# Patient Record
Sex: MALE | URBAN - METROPOLITAN AREA
[De-identification: names, ages, dates, MRNs, and addresses within clinical notes are randomized per-mention and may not be internally consistent; named-entity substitution may affect disease eponyms.]

---

## 2019-07-16 ENCOUNTER — EMPLOYEE WELLNESS (OUTPATIENT)
Dept: OTHER | Age: 49
End: 2019-07-16

## 2019-07-16 LAB
CHOLESTEROL/HDL RATIO: 7.2
CHOLESTEROL: 273 MG/DL
ESTIMATED AVERAGE GLUCOSE: 280 MG/DL
GLUCOSE BLD-MCNC: 357 MG/DL (ref 70–99)
HBA1C MFR BLD: 11.4 % (ref 4–6)
HDLC SERPL-MCNC: 38 MG/DL
LDL CHOLESTEROL: 170 MG/DL (ref 0–130)
PATIENT FASTING?: YES
TRIGL SERPL-MCNC: 324 MG/DL
VLDLC SERPL CALC-MCNC: ABNORMAL MG/DL (ref 1–30)

## 2019-07-22 VITALS — WEIGHT: 192 LBS

## 2023-06-12 ENCOUNTER — APPOINTMENT (OUTPATIENT)
Dept: CT IMAGING | Age: 53
DRG: 093 | End: 2023-06-12
Payer: COMMERCIAL

## 2023-06-12 ENCOUNTER — APPOINTMENT (OUTPATIENT)
Dept: MRI IMAGING | Age: 53
DRG: 093 | End: 2023-06-12
Payer: COMMERCIAL

## 2023-06-12 ENCOUNTER — HOSPITAL ENCOUNTER (EMERGENCY)
Age: 53
Discharge: HOME | DRG: 093 | End: 2023-06-12
Attending: EMERGENCY MEDICINE | Admitting: PSYCHIATRY & NEUROLOGY
Payer: COMMERCIAL

## 2023-06-12 VITALS
HEART RATE: 91 BPM | HEIGHT: 73 IN | WEIGHT: 205 LBS | DIASTOLIC BLOOD PRESSURE: 86 MMHG | OXYGEN SATURATION: 98 % | BODY MASS INDEX: 27.17 KG/M2 | TEMPERATURE: 98.7 F | RESPIRATION RATE: 16 BRPM | SYSTOLIC BLOOD PRESSURE: 152 MMHG

## 2023-06-12 DIAGNOSIS — R20.0 LEFT LEG NUMBNESS: ICD-10-CM

## 2023-06-12 DIAGNOSIS — R26.9 GAIT DIFFICULTY: ICD-10-CM

## 2023-06-12 DIAGNOSIS — R27.0 ATAXIA: Primary | ICD-10-CM

## 2023-06-12 DIAGNOSIS — R29.90 STROKE-LIKE SYMPTOMS: ICD-10-CM

## 2023-06-12 PROBLEM — I63.9 ISCHEMIC STROKE (HCC): Status: ACTIVE | Noted: 2023-06-12

## 2023-06-12 LAB
ANION GAP SERPL CALCULATED.3IONS-SCNC: 18 MMOL/L (ref 9–17)
ANION GAP: 14 MMOL/L (ref 7–16)
BASOPHILS # BLD: 0.09 K/UL (ref 0–0.2)
BASOPHILS NFR BLD: 1 % (ref 0–2)
BUN SERPL-MCNC: 19 MG/DL (ref 6–20)
CALCIUM SERPL-MCNC: 10 MG/DL (ref 8.6–10.4)
CHLORIDE SERPL-SCNC: 101 MMOL/L (ref 98–107)
CK SERPL-CCNC: 62 U/L (ref 39–308)
CO2 SERPL-SCNC: 18 MMOL/L (ref 20–31)
CREAT SERPL-MCNC: 1 MG/DL (ref 0.7–1.2)
EGFR, POC: >60 ML/MIN/1.73M2
EOSINOPHIL # BLD: 0.41 K/UL (ref 0–0.44)
EOSINOPHILS RELATIVE PERCENT: 6 % (ref 1–4)
ERYTHROCYTE [DISTWIDTH] IN BLOOD BY AUTOMATED COUNT: 11.8 % (ref 11.8–14.4)
EST. AVERAGE GLUCOSE BLD GHB EST-MCNC: 177 MG/DL
ETHANOL PERCENT: <0.01 %
ETHANOLAMINE SERPL-MCNC: <10 MG/DL
FOLATE SERPL-MCNC: 12.3 NG/ML
GFR SERPL CREATININE-BSD FRML MDRD: >60 ML/MIN/1.73M2
GLUCOSE BLD-MCNC: 202 MG/DL (ref 74–100)
GLUCOSE SERPL-MCNC: 205 MG/DL (ref 70–99)
HBA1C MFR BLD: 7.8 % (ref 4–6)
HCO3 VENOUS: 23.7 MMOL/L (ref 22–29)
HCT VFR BLD AUTO: 49.6 % (ref 40.7–50.3)
HCT VFR BLD AUTO: 54 % (ref 41–53)
HGB BLD-MCNC: 16.8 G/DL (ref 13–17)
IMM GRANULOCYTES # BLD AUTO: 0.05 K/UL (ref 0–0.3)
IMM GRANULOCYTES NFR BLD: 1 %
INR PPP: 0.9
LYMPHOCYTES # BLD: 26 % (ref 24–43)
LYMPHOCYTES NFR BLD: 1.89 K/UL (ref 1.1–3.7)
MCH RBC QN AUTO: 33.8 PG (ref 25.2–33.5)
MCHC RBC AUTO-ENTMCNC: 33.9 G/DL (ref 28.4–34.8)
MCV RBC AUTO: 99.8 FL (ref 82.6–102.9)
MONOCYTES NFR BLD: 0.73 K/UL (ref 0.1–1.2)
MONOCYTES NFR BLD: 10 % (ref 3–12)
MYOGLOBIN SERPL-MCNC: 42 NG/ML (ref 28–72)
NEGATIVE BASE EXCESS, VEN: 3 (ref 0–2)
NEUTROPHILS NFR BLD: 56 % (ref 36–65)
NEUTS SEG NFR BLD: 3.99 K/UL (ref 1.5–8.1)
NRBC AUTOMATED: 0 PER 100 WBC
O2 SAT, VEN: 40 % (ref 60–85)
PARTIAL THROMBOPLASTIN TIME: 28.8 SEC (ref 23–36.5)
PCO2, VEN: 45.2 MM HG (ref 41–51)
PH VENOUS: 7.33 (ref 7.32–7.43)
PLATELET # BLD AUTO: 252 K/UL (ref 138–453)
PMV BLD AUTO: 10.4 FL (ref 8.1–13.5)
PO2, VEN: 24.7 MM HG (ref 30–50)
POC BUN: 18 MG/DL (ref 8–26)
POC CHLORIDE: 104 MMOL/L (ref 98–107)
POC CREATININE: 0.71 MG/DL (ref 0.51–1.19)
POC HEMOGLOBIN: 18.4 G/DL (ref 13.5–17.5)
POC IONIZED CALCIUM: 1.29 MMOL/L (ref 1.15–1.33)
POC LACTIC ACID: 1.38 MMOL/L (ref 0.56–1.39)
POC SODIUM: 141 MMOL/L (ref 138–146)
POC TCO2: 24 MMOL/L (ref 22–30)
POTASSIUM BLD-SCNC: 4.1 MMOL/L (ref 3.5–4.5)
POTASSIUM SERPL-SCNC: 4.1 MMOL/L (ref 3.7–5.3)
PROTHROMBIN TIME: 11.6 SEC (ref 11.7–14.9)
RBC # BLD AUTO: 4.97 M/UL (ref 4.21–5.77)
SODIUM SERPL-SCNC: 137 MMOL/L (ref 135–144)
TROPONIN I SERPL HS-MCNC: 23 NG/L (ref 0–22)
TSH SERPL-MCNC: 1.08 UIU/ML (ref 0.3–5)
VIT B12 SERPL-MCNC: 353 PG/ML (ref 232–1245)
WBC OTHER # BLD: 7.2 K/UL (ref 3.5–11.3)

## 2023-06-12 PROCEDURE — 99285 EMERGENCY DEPT VISIT HI MDM: CPT

## 2023-06-12 PROCEDURE — 6370000000 HC RX 637 (ALT 250 FOR IP)

## 2023-06-12 PROCEDURE — 96360 HYDRATION IV INFUSION INIT: CPT

## 2023-06-12 PROCEDURE — 82746 ASSAY OF FOLIC ACID SERUM: CPT

## 2023-06-12 PROCEDURE — 70450 CT HEAD/BRAIN W/O DYE: CPT

## 2023-06-12 PROCEDURE — 82330 ASSAY OF CALCIUM: CPT

## 2023-06-12 PROCEDURE — G0480 DRUG TEST DEF 1-7 CLASSES: HCPCS

## 2023-06-12 PROCEDURE — 82607 VITAMIN B-12: CPT

## 2023-06-12 PROCEDURE — 82550 ASSAY OF CK (CPK): CPT

## 2023-06-12 PROCEDURE — 70551 MRI BRAIN STEM W/O DYE: CPT

## 2023-06-12 PROCEDURE — 83036 HEMOGLOBIN GLYCOSYLATED A1C: CPT

## 2023-06-12 PROCEDURE — 84443 ASSAY THYROID STIM HORMONE: CPT

## 2023-06-12 PROCEDURE — 82553 CREATINE MB FRACTION: CPT

## 2023-06-12 PROCEDURE — 84484 ASSAY OF TROPONIN QUANT: CPT

## 2023-06-12 PROCEDURE — 2580000003 HC RX 258

## 2023-06-12 PROCEDURE — 99239 HOSP IP/OBS DSCHRG MGMT >30: CPT | Performed by: PSYCHIATRY & NEUROLOGY

## 2023-06-12 PROCEDURE — 6360000004 HC RX CONTRAST MEDICATION: Performed by: EMERGENCY MEDICINE

## 2023-06-12 PROCEDURE — 96361 HYDRATE IV INFUSION ADD-ON: CPT

## 2023-06-12 PROCEDURE — 85014 HEMATOCRIT: CPT

## 2023-06-12 PROCEDURE — 93005 ELECTROCARDIOGRAM TRACING: CPT | Performed by: EMERGENCY MEDICINE

## 2023-06-12 PROCEDURE — 82565 ASSAY OF CREATININE: CPT

## 2023-06-12 PROCEDURE — 84520 ASSAY OF UREA NITROGEN: CPT

## 2023-06-12 PROCEDURE — 82947 ASSAY GLUCOSE BLOOD QUANT: CPT

## 2023-06-12 PROCEDURE — 85610 PROTHROMBIN TIME: CPT

## 2023-06-12 PROCEDURE — 1200000000 HC SEMI PRIVATE

## 2023-06-12 PROCEDURE — 85730 THROMBOPLASTIN TIME PARTIAL: CPT

## 2023-06-12 PROCEDURE — 99223 1ST HOSP IP/OBS HIGH 75: CPT | Performed by: PSYCHIATRY & NEUROLOGY

## 2023-06-12 PROCEDURE — 80051 ELECTROLYTE PANEL: CPT

## 2023-06-12 PROCEDURE — 83605 ASSAY OF LACTIC ACID: CPT

## 2023-06-12 PROCEDURE — 99222 1ST HOSP IP/OBS MODERATE 55: CPT | Performed by: PSYCHIATRY & NEUROLOGY

## 2023-06-12 PROCEDURE — 82803 BLOOD GASES ANY COMBINATION: CPT

## 2023-06-12 PROCEDURE — 80048 BASIC METABOLIC PNL TOTAL CA: CPT

## 2023-06-12 PROCEDURE — 85027 COMPLETE CBC AUTOMATED: CPT

## 2023-06-12 PROCEDURE — 83874 ASSAY OF MYOGLOBIN: CPT

## 2023-06-12 PROCEDURE — 70496 CT ANGIOGRAPHY HEAD: CPT

## 2023-06-12 RX ORDER — ASPIRIN 81 MG/1
81 TABLET, CHEWABLE ORAL DAILY
Status: DISCONTINUED | OUTPATIENT
Start: 2023-06-13 | End: 2023-06-12

## 2023-06-12 RX ORDER — ATORVASTATIN CALCIUM 80 MG/1
80 TABLET, FILM COATED ORAL NIGHTLY
Status: DISCONTINUED | OUTPATIENT
Start: 2023-06-12 | End: 2023-06-12 | Stop reason: HOSPADM

## 2023-06-12 RX ORDER — ONDANSETRON 2 MG/ML
4 INJECTION INTRAMUSCULAR; INTRAVENOUS EVERY 6 HOURS PRN
Status: DISCONTINUED | OUTPATIENT
Start: 2023-06-12 | End: 2023-06-12 | Stop reason: HOSPADM

## 2023-06-12 RX ORDER — 0.9 % SODIUM CHLORIDE 0.9 %
1000 INTRAVENOUS SOLUTION INTRAVENOUS ONCE
Status: COMPLETED | OUTPATIENT
Start: 2023-06-12 | End: 2023-06-12

## 2023-06-12 RX ORDER — ASPIRIN 81 MG/1
81 TABLET ORAL DAILY
Status: DISCONTINUED | OUTPATIENT
Start: 2023-06-12 | End: 2023-06-12 | Stop reason: HOSPADM

## 2023-06-12 RX ORDER — ATORVASTATIN CALCIUM 80 MG/1
80 TABLET, FILM COATED ORAL NIGHTLY
Status: DISCONTINUED | OUTPATIENT
Start: 2023-06-12 | End: 2023-06-12

## 2023-06-12 RX ORDER — CLOPIDOGREL BISULFATE 75 MG/1
75 TABLET ORAL DAILY
Status: DISCONTINUED | OUTPATIENT
Start: 2023-06-13 | End: 2023-06-12

## 2023-06-12 RX ORDER — ENOXAPARIN SODIUM 100 MG/ML
40 INJECTION SUBCUTANEOUS DAILY
Status: DISCONTINUED | OUTPATIENT
Start: 2023-06-12 | End: 2023-06-12 | Stop reason: HOSPADM

## 2023-06-12 RX ORDER — CLOPIDOGREL BISULFATE 75 MG/1
75 TABLET ORAL DAILY
Qty: 21 TABLET | Refills: 0 | Status: SHIPPED | OUTPATIENT
Start: 2023-06-12 | End: 2023-07-03

## 2023-06-12 RX ORDER — ASPIRIN 325 MG
325 TABLET, DELAYED RELEASE (ENTERIC COATED) ORAL ONCE
Status: COMPLETED | OUTPATIENT
Start: 2023-06-12 | End: 2023-06-12

## 2023-06-12 RX ORDER — ATORVASTATIN CALCIUM 80 MG/1
80 TABLET, FILM COATED ORAL NIGHTLY
Qty: 30 TABLET | Refills: 3 | Status: SHIPPED | OUTPATIENT
Start: 2023-06-12 | End: 2023-07-05

## 2023-06-12 RX ORDER — ONDANSETRON 4 MG/1
4 TABLET, ORALLY DISINTEGRATING ORAL EVERY 8 HOURS PRN
Status: DISCONTINUED | OUTPATIENT
Start: 2023-06-12 | End: 2023-06-12 | Stop reason: HOSPADM

## 2023-06-12 RX ORDER — ASPIRIN 300 MG/1
300 SUPPOSITORY RECTAL DAILY
Status: DISCONTINUED | OUTPATIENT
Start: 2023-06-12 | End: 2023-06-12 | Stop reason: HOSPADM

## 2023-06-12 RX ORDER — SODIUM CHLORIDE 9 MG/ML
INJECTION, SOLUTION INTRAVENOUS CONTINUOUS
Status: DISCONTINUED | OUTPATIENT
Start: 2023-06-12 | End: 2023-06-12 | Stop reason: HOSPADM

## 2023-06-12 RX ORDER — ASPIRIN 81 MG/1
81 TABLET ORAL DAILY
Qty: 30 TABLET | Refills: 3 | Status: SHIPPED | OUTPATIENT
Start: 2023-06-12 | End: 2023-07-05

## 2023-06-12 RX ORDER — CLOPIDOGREL 300 MG/1
300 TABLET, FILM COATED ORAL ONCE
Status: COMPLETED | OUTPATIENT
Start: 2023-06-12 | End: 2023-06-12

## 2023-06-12 RX ORDER — POLYETHYLENE GLYCOL 3350 17 G/17G
17 POWDER, FOR SOLUTION ORAL DAILY PRN
Status: DISCONTINUED | OUTPATIENT
Start: 2023-06-12 | End: 2023-06-12 | Stop reason: HOSPADM

## 2023-06-12 RX ADMIN — ASPIRIN 325 MG: 325 TABLET, COATED ORAL at 07:32

## 2023-06-12 RX ADMIN — SODIUM CHLORIDE 1000 ML: 9 INJECTION, SOLUTION INTRAVENOUS at 07:32

## 2023-06-12 RX ADMIN — IOPAMIDOL 90 ML: 755 INJECTION, SOLUTION INTRAVENOUS at 07:10

## 2023-06-12 RX ADMIN — CLOPIDOGREL BISULFATE 300 MG: 300 TABLET, FILM COATED ORAL at 07:32

## 2023-06-12 ASSESSMENT — ENCOUNTER SYMPTOMS
VOMITING: 0
ABDOMINAL PAIN: 0
COUGH: 0
SHORTNESS OF BREATH: 0
NAUSEA: 0

## 2023-06-12 NOTE — ED PROVIDER NOTES
[facility]  ED  Emergency Department Encounter  Emergency Medicine Resident     Pt Santa Gudino  MRN: [id number]  [place] 1970  Date of evaluation: 6/12/23  PCP:  No primary care provider on file. Note Started: 6:53 AM EDT      CHIEF COMPLAINT       Chief Complaint   Patient presents with    Extremity Weakness       HISTORY OF PRESENT ILLNESS  (Location/Symptom, Timing/Onset, Context/Setting, Quality, Duration, Modifying Factors, Severity.)      Art Muro is a 47 y.o. male who presents with left lower extremity weakness. Patient states the left lower extremity weakness and numbness started yesterday at around 2 PM, he states it is progressively worsened. And was notably worse this morning when he got up to go to the bathroom. Patient's wife who is at bedside states he has to stand with a wide base to not lose his balance. Patient denies any dizziness. Patient has a history of hypertension and diabetes. He is not on insulin for his diabetes. Not on any anticoagulation or antiplatelets. PAST MEDICAL / SURGICAL / SOCIAL / FAMILY HISTORY     Diabetes, HTN       has no past surgical history on file.       Social History     Socioeconomic History    Marital status: Not on file     Spouse name: Not on file    Number of children: Not on file    Years of education: Not on file    Highest education level: Not on file   Occupational History    Not on file   Tobacco Use    Smoking status: Not on file    Smokeless tobacco: Not on file   Substance and Sexual Activity    Alcohol use: Not on file    Drug use: Not on file    Sexual activity: Not on file   Other Topics Concern    Not on file   Social History Narrative    Not on file     Social Determinants of Health     Financial Resource Strain: Not on file   Food Insecurity: Not on file   Transportation Needs: Not on file   Physical Activity: Not on file   Stress: Not on file   Social Connections: Not on file   Intimate Partner Violence:

## 2023-06-12 NOTE — ED PROVIDER NOTES
FACULTY SIGN-OUT  ADDENDUM       Patient: Lottie Najera   MRN: [id number]  PCP:  No primary care provider on file. Note Started: 6/12/23, 7:27 AM EDT  Attestation  I was available and discussed any additional care issues that arose and coordinated the management plans with the resident(s) caring for the patient during my duty period. Any areas of disagreement with resident's documentation of care or procedures are noted on the chart. I was personally present for the key portions of any/all procedures during my duty period. I have documented in the chart those procedures where I was not present during the key portions. The patient's initial evaluation and plan have been discussed with the prior provider who initially evaluated the patient. Pertinent Comments: The patient is a 47 y.o. male taken in signout with isolated left lower extremity ataxia and intermittent inability to control the leg.     We are awaiting stroke consult noncritical and further imaging    ED COURSE      The patient was given the following medications:  Orders Placed This Encounter   Medications    iopamidol (ISOVUE-370) 76 % injection 90 mL    aspirin EC tablet 325 mg    clopidogrel (PLAVIX) tablet 300 mg    aspirin chewable tablet 81 mg    clopidogrel (PLAVIX) tablet 75 mg    atorvastatin (LIPITOR) tablet 80 mg    0.9 % sodium chloride bolus       RECENT VITALS:   BP: (!) 164/63  Pulse: (!) 103  Respirations: 17  Temp: 98.7 °F (37.1 °C) SpO2: 97 %    (Please note that portions of this note were completed with a voice recognition program.  Efforts were made to edit the dictations but occasionally words are mis-transcribed.)    MD Fidel Stewart  Attending Emergency Medicine Physician       Bebe Evangelista MD  06/12/23 1106

## 2023-06-12 NOTE — ED NOTES
Writer called lab due to ethanol level saying its been in process since 1330 and still hasnt resulted   states she will run it now  Will continue plan of care     Cari Kellogg RN  06/12/23 6640

## 2023-06-12 NOTE — PROGRESS NOTES
[address] [facility] [address]     Emergency/Trauma Note    PATIENT NAME: Jorge Wagoner    Shift date: 6/11/2023  Shift day: Sunday   Shift # 3    Room # 09/09   Name: Jorge Wagoner            Age: 47 y.o. Gender: male          Rastafari: None   Place of Confucianism: Unknown    Trauma/Incident type: Stroke Alert  Admit Date & Time: 6/12/2023  6:42 AM  TRAUMA NAME: N/A    ADVANCE DIRECTIVES IN CHART? No    NAME OF DECISION MAKER: Per next of kin hierarchy, patient's spouse, Lester Galan ([phone]), is patient's primary decision maker. RELATIONSHIP OF DECISION MAKER TO PATIENT: Patient's spouse    PATIENT/EVENT DESCRIPTION:  Jorge Wagoner is a 47 y.o. male who arrived via personal vehicle to ED9 and was paged out as a \"Stroke Alert. \" Per report, patient's \"leg was not functioning properly. \" Patient was in [address] [place] [address] when  visited. Pt to be admitted to 09/09. SPIRITUAL ASSESSMENT-INTERVENTION-OUTCOME:   responded to page and gathered patient information outside room.  introduced herself to patient's spouse who was present in room while patient was in CT Scan. Patient's spouse shared about patient's symptoms and indicated that he was feeling \"slightly anxious. \" Prince Patterson made follow-up visit and provided support to patient. Patient was coping well and indicated no needs. PATIENT BELONGINGS:   bagged and tagged patient's shoes and shirt. ANY BELONGINGS OF SIGNIFICANT VALUE NOTED:  See above. REGISTRATION STAFF NOTIFIED?   Yes      WHAT IS YOUR SPIRITUAL CARE PLAN FOR THIS PATIENT?:  [facility] can make follow-up visit, per request. Jil Jeffers can be reached 24/7 via [email or web address].     06/12/23 0702   Encounter Summary   Service Provided For: Patient and family together   Referral/Consult From: Multi-disciplinary team  (Stroke Alert)   Support System Spouse   Last Encounter  06/11/23   Complexity of Encounter Moderate   Begin Time 0702   End Time  0717   Total Time Calculated 15 min

## 2023-06-12 NOTE — ED NOTES
ED to inpatient nurses report      Chief Complaint:  Chief Complaint   Patient presents with    Extremity Weakness     Present to ED from: home wit complaints of left leg weakness since 2pm yesterday    MOA:     LOC: alert and orientated to name, place, date  Mobility: Independent  Oxygen Baseline: 97%    Current needs required:   Pending ED orders:   Present condition: stable    Why did the patient come to the ED? Pt came  c/o extremity weakness and numbness. Pt states symptoms start yesterday about 2pm. Pt said he felt like weight on his left leg, and when he walks he leans towards the left and felt dizzy as well. Early this morning he feel that his left leg is more weak when he walks towards the bathroom. What is the plan? MRI  Any procedures or intervention occur? CT    Mental Status:  Level of Consciousness: Alert (0)    Psych Assessment:   Psychosocial  Psychosocial (WDL): Within Defined Limits  Vital signs   Vitals:    06/12/23 0703 06/12/23 0717 06/12/23 0726 06/12/23 0730   BP: (!) 169/88 (!) 164/63  (!) 150/91   Pulse:  (!) 103  97   Resp:  17  17   Temp:       SpO2:       Weight:   205 lb (93 kg)    Height:   6' 1\" (1.854 m)         Vitals:  Patient Vitals for the past 24 hrs:   BP Temp Pulse Resp SpO2 Height Weight   06/12/23 0730 (!) 150/91 -- 97 17 -- -- --   06/12/23 0726 -- -- -- -- -- 6' 1\" (1.854 m) 205 lb (93 kg)   06/12/23 0717 (!) 164/63 -- (!) 103 17 -- -- --   06/12/23 0703 (!) 169/88 -- -- -- -- -- --   06/12/23 0649 -- 98.7 °F (37.1 °C) -- -- -- -- --   06/12/23 0647 (!) 176/101 -- (!) 105 16 97 % -- --      Visit Vitals  BP (!) 150/91   Pulse 97   Temp 98.7 °F (37.1 °C)   Resp 17   Ht 6' 1\" (1.854 m)   Wt 205 lb (93 kg)   SpO2 97%   BMI 27.05 kg/m²        LDAs:   Peripheral IV 06/12/23 Left Antecubital (Active)   Site Assessment Clean, dry & intact; Clean 06/12/23 0708   Line Status Blood return noted;Brisk blood return 06/12/23 0708   Phlebitis Assessment No symptoms 06/12/23 0708

## 2023-06-12 NOTE — ED NOTES
Pt came  c/o extremity weakness and numbness. Pt states symptoms start yesterday about 2pm. Pt said he felt like weight on his left leg, and when he walks he leans towards the left and felt dizzy as well. Early this morning he feel that his left leg is more weak when he walks towards the bathroom. Pt awake alert and oriented, not in distress, denies SOB or chest pain.       Derl File, RN  06/12/23 1196

## 2023-06-12 NOTE — H&P
Department of General Neurology  [facility]  Inpatient History and Physical Exam Note    History Obtained From:  patient, spouse, electronic medical record    CHIEF COMPLAINT:       Gait disturbance    HISTORY OF PRESENT ILLNESS:       The patient is a 47 y.o. male with PMHx of DM, celf palate, and HTN who presents with sudden onset imbalance, left leg numbness, and drifting towards left during ambulation. All started suddenly yesterday afternoon, resulted in fall without LOC or hit to the head last night, and then progressively worsened today to the point where patient was unable to ambulate without assistance. He denies any recent trauma or illnesses. No associated headache, dizziness, visual or speech disturbance, facial asymmetry, or confusion. No history of migraines, strokes, headaches. He does have intermittent chronic left hip pain which he feels is at baseline. Wife at bedside is RN and endorses pt is compliant with medications at home. Last know well: 14:00 on 6/11/2023      On presentation:  BP: 176/101  BSL: 202    Prior to arrival patient was on  Antiplatelets/anticoagulants: none  Statins: rosuvastatin calcium 10 mg qd    Smoking history: past social smoker (never regular tobacco use though) & quit 20 years ago    Home medications verified with pt's wife:  Glipizide 20 mg twice daily  Metformin 1000 mg twice daily  Jardiance 100 mg daily  Rosuvastatin calcium 10 mg daily  Nateglinide 120 mg 3 times daily  Lisinopril 10 mg daily  Esomeprazole magnesium 20 mg daily       PAST MEDICAL HISTORY :       Past Medical History:    No past medical history on file. Past Surgical History:    No past surgical history on file.     Social History:   Social History     Socioeconomic History    Marital status:      Spouse name: Not on file    Number of children: Not on file    Years of education: Not on file    Highest education level: Not on file   Occupational History    Not on file

## 2023-06-12 NOTE — ED NOTES
Writer spoke with family member after she was asking for an update  Writer updated family member on how we are still waiting for labs to come back  Writer has called down to lab due to them letting an ethanol level be in process for 3 hours and how it is unacceptable  Writer apologizes to family member and lets them know that it is out of my control  Neuro resident made it clear that these labs have to come back before safetly being discharged  Family member told writer how pt is a daily drinker and is now getting irritated due to situation  Told family member that Page Mares will update her as soon as we get a result  Will continue plan of care     Thompson Villaseñor RN  06/12/23 6226

## 2023-06-12 NOTE — ED PROVIDER NOTES
[facility]   [facility]  Faculty Attestation       I performed a history and physical examination of the patient and discussed management with the resident. I reviewed the residents note and agree with the documented findings including all diagnostic interpretations and plan of care. Any areas of disagreement are noted on the chart. I was personally present for the key portions of any procedures. I have documented in the chart those procedures where I was not present during the key portions. I have reviewed the emergency nurses triage note. I agree with the chief complaint, past medical history, past surgical history, allergies, medications, social and family history as documented unless otherwise noted below. For Physician Assistant/ Nurse Practitioner cases/documentation I have personally evaluated this patient and have completed at least one if not all key elements of the E/M (history, physical exam, and MDM). Additional findings are as noted. Patient Name: Emma Domingo  MRN: [id number]  : 1970  Primary Care Physician: No primary care provider on file. Date of evaluationa: 2023   Note Started: 9:05 AM EDT    Pertinent Comments     Chief Complaint:   Chief Complaint   Patient presents with    Extremity Weakness        Initial vitals: (If not listed, please see nursing documentation)  ED Triage Vitals   BP Temp Temp src Pulse Respirations SpO2 Height Weight - Scale   23 0647 23 0649 -- 23 0647 23 0647 23 0647 23 0726 23 0726   (!) 176/101 98.7 °F (37.1 °C)  (!) 105 16 97 % 6' 1\" (1.854 m) 205 lb (93 kg)        HPI/PE/Impression: This is a 47 y.o. male who presents to the Emergency Department w/ left lower extremity abnormality. States that he is not able to \"control\" his left leg well. First noticed this at 2 PM yesterday. He denies any other symptoms including no facial droop. No change in speech.   No upper extremity

## 2023-06-12 NOTE — ED NOTES
The following labs were labeled with appropriate pt sticker and tubed to lab:     [x] Blue     [x] Lavender   [] on ice  [x] Green/yellow  [x] Green/black [] on ice  [] Vianca Fuentes  [] on ice  [x] Yellow  [x] Red  [] Type/ Screen  [] ABG  [] VBG    [] COVID-19 swab    [] Rapid  [] PCR  [] Flu swab  [] Peds Viral Panel     [] Urine Sample  [] Fecal Sample  [] Pelvic Cultures  [] Blood Cultures  [] X 2  [] STREP Cultures       Elin Bradley, GALINA  06/12/23 3827

## 2023-06-12 NOTE — DISCHARGE INSTRUCTIONS
You were seen in the hospital with sudden onset imbalance and difficulty with walking. We did an MRI of your brain and You did not have a stroke. However, because you do have stroke risk factors, we have started you on some medications to help prevent strokes. You are still having extreme difficulty with walking and despite our recommendation to stay in the hospital to do physical therapy and get you into rehab, your preference was to go home, so per your insistence, we will discharge you home with outpatient therapies. Please schedule appointments for physical therapy, for an outpatient EMG (which tests the nerves in your legs), for follow up in Neurology clinic, and for follow up with your PCP. Take all new medications as prescribed; take Aspirin 81 mg daily every day life-long, take Plavix 75 mg daily only for the next 21 days. Stop taking rosuvastatin 10 mg daily and start taking atorvastatin 80 mg nightly instead. Other than the rosuvastatin, Your other home medications can be taken as currently prescribed. Monitor your blood pressure very day and make a log with your BP to review with your PCP. Get your labwork done tomorrow to check for a fasting lipid panel (we would have checked in hospital, but you wanted to go home and this needs to be done fasting). Gradually/slowly reduce alcohol consumption until you are no longer consuming alcohol; this will help with your balance and leg numbness. If symptoms worsen or recur, please go to your nearest emergency department.

## 2023-06-12 NOTE — ED PROVIDER NOTES
[facility] ED  Emergency Department  Emergency Medicine Resident Turn-Over     Note Started: 7:26 AM EDT    Care of Haleigh Miranda was assumed from Dr. Catalina Torres and is being seen for Extremity Weakness  . The patient's initial evaluation and plan have been discussed with the prior provider who initially evaluated the patient. EMERGENCY DEPARTMENT COURSE / MEDICAL DECISION MAKING:       MEDICATIONS GIVEN:  Orders Placed This Encounter   Medications    iopamidol (ISOVUE-370) 76 % injection 90 mL    aspirin EC tablet 325 mg    clopidogrel (PLAVIX) tablet 300 mg    aspirin chewable tablet 81 mg    clopidogrel (PLAVIX) tablet 75 mg    atorvastatin (LIPITOR) tablet 80 mg    0.9 % sodium chloride bolus       LABS / RADIOLOGY:     Labs Reviewed   STROKE PANEL - Abnormal; Notable for the following components:       Result Value    MCH 33.8 (*)     Eosinophils % 6 (*)     Immature Granulocytes 1 (*)     All other components within normal limits   HGB/HCT - Abnormal; Notable for the following components:    POC Hemoglobin 18.4 (*)     POC Hematocrit 54 (*)     All other components within normal limits   VENOUS BLOOD GAS, POINT OF CARE - Abnormal; Notable for the following components:    pO2, Brady 24.7 (*)     Negative Base Excess, Brady 3 (*)     O2 Sat, Brady 40 (*)     All other components within normal limits   POCT GLUCOSE - Abnormal; Notable for the following components:    POC Glucose 202 (*)     All other components within normal limits   ELECTROLYTES PLUS   CALCIUM, IONIC (POC)   CREATININE W/GFR POINT OF CARE   UREA N (POC)   LACTIC ACID,POINT OF CARE       No results found. RECENT VITALS:     Temp: 98.7 °F (37.1 °C),  Pulse: 97, Respirations: 17, BP: (!) 150/91, SpO2: 97 %    This patient is a 47 y.o. Male with weakness, ataxia in LLE starting 2 pm yesterday, worsening. Unaware of LLE in space. Hx DM, HTN, not on anticoagulation. No prior hx CVA.  Stroke alert noncritical for NIH 1. CT head, CTA

## 2023-06-12 NOTE — CONSULTS
Department of Endovascular Neurosurgery                                                                                                                      Resident Consult Note  Stroke Alert paged @ 6:52 AM  ER Room # 9  Arrival to patient bedside @ 7:00 AM        Reason for Consult:  stroke alert  Requesting Physician:  Dr. Tenzin Sandra  Stroke Attending:   []Dr. Alex Agent  []Dr. Marylen Dew  [x]Dr. Belle Montanez    History Obtained From:  patient, spouse, electronic medical record    CHIEF COMPLAINT:       Gait disturbance    HISTORY OF PRESENT ILLNESS:       The patient is a 47 y.o. male with PMHx of DM, celf palate, and HTN who presents with sudden onset imbalance, left leg numbness, and drifting towards left during ambulation. All started suddenly yesterday afternoon, resulted in fall without LOC or hit to the head last night, and then progressively worsened today to the point where patient was unable to ambulate without assistance. He denies any recent trauma or illnesses. No associated headache, dizziness, visual or speech disturbance, facial asymmetry, or confusion. No history of migraines, strokes, headaches. He does have intermittent chronic left hip pain which he feels is at baseline. Wife at bedside is RN and endorses pt is compliant with medications at home. Last know well: 14:00 on 6/11/2023      On presentation:  BP: 176/101  BSL: 202    Prior to arrival patient was on  Antiplatelets/anticoagulants: none  Statins: rosuvastatin calcium 10 mg qd    Smoking history: past social smoker (never regular tobacco use though) & quit 20 years ago       PAST MEDICAL HISTORY :       Past Medical History:    No past medical history on file. Past Surgical History:    No past surgical history on file.     Social History:   Social History     Socioeconomic History    Marital status:      Spouse name: Not on file    Number of

## 2023-06-12 NOTE — DISCHARGE SUMMARY
Neurology Discharge Summary     Patient Identification:  Juan José Moreno is a 47 y.o. male. :  1970  Admit Date:  2023  Discharge date and time: No discharge date for patient encounter. Attending Provider: Dony Remy DO     Account Number: [id number]                                   Admission Diagnoses:   Ischemic stroke [facility]) [I63.9]    Discharge Diagnoses:  Principal Problem:    Stroke-like symptoms  Active Problems:    Ataxia    Left leg numbness    Gait difficulty  Resolved Problems:    * No resolved hospital problems. *      Discharge condition:    Consults:   {consultation:32262}    Hospital Course:***      Significant Diagnostics:   {diagnostics:10216}  CBC:   Recent Labs     23  07   WBC 7.2   HGB 16.8        BMP:    Recent Labs     23  0655 23   NA  --  137   K  --  4.1   CL  --  101   CO2  --  18*   BUN  --  19   CREATININE 0.71 1.00   GLUCOSE  --  205*         Lab Results   Component Value Date    CHOL 273 (H) 2019    LDLCHOLESTEROL 170 (H) 2019    HDL 38 (L) 2019    TRIG 324 (H) 2019    TSH 1.08 2023    INR 0.9 2023    LABA1C 11.4 (H) 2019    [id number] 353 2023       No results found for: PHENYTOIN, PHENYTOIN, VALPROATE, CBMZ      Discharge Medications:    Current Discharge Medication List             Details   aspirin 81 MG EC tablet Take 1 tablet by mouth daily  Qty: 30 tablet, Refills: 3      atorvastatin (LIPITOR) 80 MG tablet Take 1 tablet by mouth nightly  Qty: 30 tablet, Refills: 3      clopidogrel (PLAVIX) 75 MG tablet Take 1 tablet by mouth daily for 21 days  Qty: 21 tablet, Refills: 0           Current Discharge Medication List          Disposition:   {disposition:51718}    Patient Instructions: You were seen in the hospital with sudden onset imbalance and difficulty with walking. We did an MRI of your brain and You did not have a stroke.   However, because you do have stroke risk factors, we

## 2023-06-13 LAB
EKG ATRIAL RATE: 103 BPM
EKG P AXIS: 53 DEGREES
EKG P-R INTERVAL: 142 MS
EKG Q-T INTERVAL: 332 MS
EKG QRS DURATION: 80 MS
EKG QTC CALCULATION (BAZETT): 434 MS
EKG R AXIS: 5 DEGREES
EKG T AXIS: 48 DEGREES
EKG VENTRICULAR RATE: 103 BPM

## 2023-06-30 NOTE — TELEPHONE ENCOUNTER
E-scribe requesting refill for Plavix. Please review and e-scribe if applicable.      Last Visit Date: 6/12/2023    Next Visit Date:  Future Appointments   Date Time Provider [address]   8/15/2023  2:00 PM Brad Cabello MD Neuro [facility] Neurology -

## 2023-07-04 RX ORDER — CLOPIDOGREL BISULFATE 75 MG/1
75 TABLET ORAL DAILY
Qty: 21 TABLET | Refills: 0 | OUTPATIENT
Start: 2023-07-04 | End: 2023-07-25

## 2023-07-05 RX ORDER — ATORVASTATIN CALCIUM 80 MG/1
TABLET, FILM COATED ORAL
Qty: 30 TABLET | Refills: 3 | Status: SHIPPED | OUTPATIENT
Start: 2023-07-05

## 2023-07-05 RX ORDER — ASPIRIN 81 MG/1
TABLET, COATED ORAL
Qty: 30 TABLET | Refills: 3 | Status: SHIPPED | OUTPATIENT
Start: 2023-07-05

## 2023-07-05 NOTE — TELEPHONE ENCOUNTER
E-scribe requesting refill for ASPIRIN LOW DOSE 81 MG . Please review and e-scribe if applicable.      Last Visit Date: 06/26/23    Next Visit Date:  Future Appointments   Date Time Provider [address] [place] [address]   8/15/2023  2:00 PM Dorothy Alexander MD Neuro [facility] Neurology -

## 2023-07-05 NOTE — TELEPHONE ENCOUNTER
E-scribe requesting refill for atorvastatin (LIPITOR) 80 MG . Please review and e-scribe if applicable.      Last Visit Date: 06/12/23    Next Visit Date:  Future Appointments   Date Time Provider [facility] [address]   8/15/2023  2:00 PM Khushi Hernández MD Neuro [facility] Neurology -

## 2023-07-27 ENCOUNTER — OFFICE VISIT (OUTPATIENT)
Dept: NEUROLOGY | Age: 53
End: 2023-07-27
Payer: COMMERCIAL

## 2023-07-27 VITALS
DIASTOLIC BLOOD PRESSURE: 77 MMHG | BODY MASS INDEX: 26.77 KG/M2 | HEIGHT: 73 IN | SYSTOLIC BLOOD PRESSURE: 117 MMHG | HEART RATE: 98 BPM | WEIGHT: 202 LBS

## 2023-07-27 DIAGNOSIS — R20.0 LEFT LEG NUMBNESS: ICD-10-CM

## 2023-07-27 DIAGNOSIS — I63.9 CEREBROVASCULAR ACCIDENT (CVA), UNSPECIFIED MECHANISM (HCC): ICD-10-CM

## 2023-07-27 DIAGNOSIS — R27.0 ATAXIA: Primary | ICD-10-CM

## 2023-07-27 DIAGNOSIS — E13.42 DIABETIC POLYNEUROPATHY ASSOCIATED WITH OTHER SPECIFIED DIABETES MELLITUS (HCC): ICD-10-CM

## 2023-07-27 DIAGNOSIS — G47.30 SLEEP APNEA IN ADULT: ICD-10-CM

## 2023-07-27 PROCEDURE — 3051F HG A1C>EQUAL 7.0%<8.0%: CPT | Performed by: PSYCHIATRY & NEUROLOGY

## 2023-07-27 PROCEDURE — 99205 OFFICE O/P NEW HI 60 MIN: CPT | Performed by: PSYCHIATRY & NEUROLOGY

## 2023-07-27 RX ORDER — LISINOPRIL 10 MG/1
10 TABLET ORAL DAILY
COMMUNITY
Start: 2023-04-27

## 2023-07-27 RX ORDER — GABAPENTIN 100 MG/1
100 CAPSULE ORAL 2 TIMES DAILY
Qty: 180 CAPSULE | Refills: 1 | Status: SHIPPED | OUTPATIENT
Start: 2023-07-27 | End: 2024-01-23

## 2023-07-27 RX ORDER — LORATADINE 10 MG/1
TABLET ORAL
COMMUNITY
Start: 2023-06-11

## 2023-07-27 RX ORDER — EMPAGLIFLOZIN 25 MG/1
25 TABLET, FILM COATED ORAL DAILY
COMMUNITY
Start: 2023-06-13

## 2023-07-27 RX ORDER — NATEGLINIDE 120 MG/1
TABLET ORAL
COMMUNITY
Start: 2023-06-13

## 2023-07-27 RX ORDER — GLIPIZIDE 10 MG/1
TABLET ORAL
COMMUNITY
Start: 2023-05-10

## 2023-07-27 RX ORDER — ESOMEPRAZOLE MAGNESIUM 20 MG/1
20 FOR SUSPENSION ORAL DAILY
COMMUNITY

## 2023-07-27 NOTE — PROGRESS NOTES
exercise    I would like to thank you for the consult. Please do not hesitate if you have any questions about the patient care. Return in about 3 months (around 10/27/2023). The patient  acknowledged and understood the instructions, advised to reach the office for any concerns. greater than 50% of time spent face to face, reviewing images, labs, and other notes, obtaining history, examining patient, counseling and coordinating care. Sherie Ocampo MD  [facility]  Neurology     This note is created with the assistance of a speech-recognition program. While intending to generate a document that actually reflects the content of the visit, the document can still have some errors including those of syntax and sound a- like substitutions which may escape proofreading. In such instances, actual meaning can be extrapolated by contextual derivation.

## 2023-08-04 ENCOUNTER — TELEPHONE (OUTPATIENT)
Dept: NEUROLOGY | Age: 53
End: 2023-08-04

## 2023-08-04 NOTE — TELEPHONE ENCOUNTER
Hyacinth from [facility] called asking to update the order placed for MRI Brain with contrast to:  MRI Brain with and without contrast due to insurance reasons. Please update order. Thank you.

## 2023-10-03 RX ORDER — ASPIRIN 81 MG/1
TABLET, COATED ORAL
Qty: 90 TABLET | Refills: 1 | OUTPATIENT
Start: 2023-10-03

## 2023-10-03 RX ORDER — ATORVASTATIN CALCIUM 80 MG/1
TABLET, FILM COATED ORAL
Qty: 90 TABLET | Refills: 1 | OUTPATIENT
Start: 2023-10-03

## 2023-11-08 ENCOUNTER — HOSPITAL ENCOUNTER (OUTPATIENT)
Dept: NEUROLOGY | Age: 53
Discharge: HOME | End: 2023-11-08
Payer: COMMERCIAL

## 2023-11-08 PROCEDURE — 95886 MUSC TEST DONE W/N TEST COMP: CPT | Performed by: PHYSICAL MEDICINE & REHABILITATION

## 2023-11-08 PROCEDURE — 95909 NRV CNDJ TST 5-6 STUDIES: CPT | Performed by: PHYSICAL MEDICINE & REHABILITATION

## 2023-11-09 RX ORDER — GABAPENTIN 100 MG/1
100 CAPSULE ORAL 2 TIMES DAILY
Qty: 180 CAPSULE | Refills: 1 | OUTPATIENT
Start: 2023-11-09 | End: 2024-05-07

## 2023-12-07 ENCOUNTER — OFFICE VISIT (OUTPATIENT)
Dept: NEUROLOGY | Age: 53
End: 2023-12-07
Payer: COMMERCIAL

## 2023-12-07 VITALS
HEART RATE: 99 BPM | WEIGHT: 212 LBS | SYSTOLIC BLOOD PRESSURE: 171 MMHG | HEIGHT: 73 IN | BODY MASS INDEX: 28.1 KG/M2 | DIASTOLIC BLOOD PRESSURE: 98 MMHG

## 2023-12-07 DIAGNOSIS — E13.42 DIABETIC POLYNEUROPATHY ASSOCIATED WITH OTHER SPECIFIED DIABETES MELLITUS (HCC): Primary | ICD-10-CM

## 2023-12-07 DIAGNOSIS — I63.9 CEREBROVASCULAR ACCIDENT (CVA), UNSPECIFIED MECHANISM (HCC): ICD-10-CM

## 2023-12-07 DIAGNOSIS — R20.0 LEFT LEG NUMBNESS: ICD-10-CM

## 2023-12-07 DIAGNOSIS — R27.0 ATAXIA: ICD-10-CM

## 2023-12-07 DIAGNOSIS — G47.30 SLEEP APNEA IN ADULT: ICD-10-CM

## 2023-12-07 PROCEDURE — 99214 OFFICE O/P EST MOD 30 MIN: CPT | Performed by: PSYCHIATRY & NEUROLOGY

## 2023-12-07 PROCEDURE — 3051F HG A1C>EQUAL 7.0%<8.0%: CPT | Performed by: PSYCHIATRY & NEUROLOGY

## 2023-12-07 RX ORDER — GABAPENTIN 100 MG/1
100 CAPSULE ORAL 3 TIMES DAILY
Qty: 180 CAPSULE | Refills: 1 | Status: SHIPPED | OUTPATIENT
Start: 2023-12-07 | End: 2024-06-04

## 2023-12-07 NOTE — PROGRESS NOTES
CONTRAST  Narrative: EXAMINATION:  CTA OF THE HEAD AND NECK WITH CONTRAST 6/12/2023 6:56 am:    TECHNIQUE:  CTA of the head and neck was performed with the administration of intravenous  contrast. Multiplanar reformatted images are provided for review. MIP images  are provided for review. Stenosis of the internal carotid arteries measured  using NASCET criteria. Automated exposure control, iterative reconstruction,  and/or weight based adjustment of the mA/kV was utilized to reduce the  radiation dose to as low as reasonably achievable. COMPARISON:  None. HISTORY:  ORDERING SYSTEM PROVIDED HISTORY: LLE weakness    FINDINGS:    CTA NECK:    AORTIC ARCH/ARCH VESSELS: No dissection or arterial injury. No significant  stenosis of the brachiocephalic or subclavian arteries. CAROTID ARTERIES: No dissection, arterial injury, or hemodynamically  significant stenosis by NASCET criteria. VERTEBRAL ARTERIES: No dissection, arterial injury, or significant stenosis  within the right cervical vertebral artery. Origin of the left V1 segment  not well visualized with fairly diffuse left cervical vertebral artery  narrowing with areas of diminished opacification concerning for long segment  dissection. SOFT TISSUES: The lung apices are clear. No cervical or superior mediastinal  lymphadenopathy. The larynx and pharynx are unremarkable. No acute  abnormality of the salivary and thyroid glands. BONES: No acute osseous abnormality. CTA HEAD:    ANTERIOR CIRCULATION: No significant stenosis of the intracranial internal  carotid, anterior cerebral, or middle cerebral arteries. No aneurysm. POSTERIOR CIRCULATION: Occlusion of the left V4 segment. No significant  stenosis of the right vertebral, basilar, or posterior cerebral arteries. No  aneurysm. OTHER: No dural venous sinus thrombosis on this non-dedicated study. BRAIN: No mass effect or midline shift. No extra-axial fluid collection.

## 2024-01-09 DIAGNOSIS — I63.9 CEREBROVASCULAR ACCIDENT (CVA), UNSPECIFIED MECHANISM (HCC): Primary | ICD-10-CM

## 2024-01-09 RX ORDER — ATORVASTATIN CALCIUM 80 MG/1
TABLET, FILM COATED ORAL
Qty: 90 TABLET | Refills: 1 | OUTPATIENT
Start: 2024-01-09

## 2024-01-09 RX ORDER — ATORVASTATIN CALCIUM 80 MG/1
80 TABLET, FILM COATED ORAL NIGHTLY
Qty: 30 TABLET | Refills: 2 | Status: SHIPPED | OUTPATIENT
Start: 2024-01-09 | End: 2024-04-08

## 2024-01-09 NOTE — TELEPHONE ENCOUNTER
A refill request was sent in for pt's Lipitor, it was denied in error because it was sent to the [facility] office. I informed the pt that we would be sending it in for him.

## 2024-03-11 RX ORDER — ASPIRIN 81 MG/1
TABLET, COATED ORAL
Qty: 90 TABLET | Refills: 1 | Status: SHIPPED | OUTPATIENT
Start: 2024-03-11

## 2024-03-11 NOTE — TELEPHONE ENCOUNTER
E-scribe requesting refill for Aspirin. Please review and e-scribe if applicable.     Last Visit Date: 12/07/2023    Next Visit Date:  Future Appointments   Date Time Provider Department Center   6/6/2024  1:00 PM Alon Cho MD [facility] -

## 2024-04-12 DIAGNOSIS — I63.9 CEREBROVASCULAR ACCIDENT (CVA), UNSPECIFIED MECHANISM (HCC): ICD-10-CM

## 2024-04-12 RX ORDER — ATORVASTATIN CALCIUM 80 MG/1
80 TABLET, FILM COATED ORAL NIGHTLY
Qty: 90 TABLET | Refills: 5 | Status: SHIPPED | OUTPATIENT
Start: 2024-04-12

## 2024-04-12 NOTE — TELEPHONE ENCOUNTER
E-scribe requesting refill for Atorvastatin. Please review and e-scribe if applicable.     Last Visit Date: 12/07/2023    Next Visit Date:  Future Appointments   Date Time Provider Department Center   6/6/2024  1:00 PM Alon Cho MD [facility] -

## 2024-06-06 ENCOUNTER — OFFICE VISIT (OUTPATIENT)
Dept: NEUROLOGY | Age: 54
End: 2024-06-06
Payer: COMMERCIAL

## 2024-06-06 VITALS
BODY MASS INDEX: 27.35 KG/M2 | HEIGHT: 73 IN | WEIGHT: 206.4 LBS | HEART RATE: 95 BPM | DIASTOLIC BLOOD PRESSURE: 81 MMHG | SYSTOLIC BLOOD PRESSURE: 121 MMHG

## 2024-06-06 DIAGNOSIS — I63.9 CEREBROVASCULAR ACCIDENT (CVA), UNSPECIFIED MECHANISM (HCC): ICD-10-CM

## 2024-06-06 DIAGNOSIS — R20.0 LEFT LEG NUMBNESS: ICD-10-CM

## 2024-06-06 DIAGNOSIS — E13.42 DIABETIC POLYNEUROPATHY ASSOCIATED WITH OTHER SPECIFIED DIABETES MELLITUS (HCC): Primary | ICD-10-CM

## 2024-06-06 DIAGNOSIS — R27.0 ATAXIA: ICD-10-CM

## 2024-06-06 DIAGNOSIS — G47.30 SLEEP APNEA IN ADULT: ICD-10-CM

## 2024-06-06 PROCEDURE — 99214 OFFICE O/P EST MOD 30 MIN: CPT | Performed by: PSYCHIATRY & NEUROLOGY

## 2024-06-06 RX ORDER — PIOGLITAZONEHYDROCHLORIDE 30 MG/1
30 TABLET ORAL NIGHTLY
COMMUNITY
Start: 2024-05-29

## 2024-06-06 RX ORDER — GABAPENTIN 100 MG/1
200 CAPSULE ORAL 3 TIMES DAILY
Qty: 180 CAPSULE | Refills: 1 | Status: SHIPPED | OUTPATIENT
Start: 2024-06-06 | End: 2024-12-03

## 2024-06-14 ENCOUNTER — HOSPITAL ENCOUNTER (INPATIENT)
Age: 54
LOS: 5 days | Discharge: HOME | End: 2024-06-19
Attending: EMERGENCY MEDICINE | Admitting: STUDENT IN AN ORGANIZED HEALTH CARE EDUCATION/TRAINING PROGRAM
Payer: COMMERCIAL

## 2024-06-14 ENCOUNTER — APPOINTMENT (OUTPATIENT)
Dept: MRI IMAGING | Age: 54
End: 2024-06-14
Payer: COMMERCIAL

## 2024-06-14 ENCOUNTER — APPOINTMENT (OUTPATIENT)
Dept: GENERAL RADIOLOGY | Age: 54
End: 2024-06-14
Payer: COMMERCIAL

## 2024-06-14 DIAGNOSIS — M86.9 OSTEOMYELITIS OF RIGHT FOOT, UNSPECIFIED TYPE (HCC): ICD-10-CM

## 2024-06-14 DIAGNOSIS — M86.9 OSTEOMYELITIS OF SECOND TOE OF LEFT FOOT (HCC): Primary | ICD-10-CM

## 2024-06-14 DIAGNOSIS — L03.116 CELLULITIS OF LEFT FOOT: ICD-10-CM

## 2024-06-14 DIAGNOSIS — G89.18 POST-OP PAIN: ICD-10-CM

## 2024-06-14 PROBLEM — E11.42 DIABETIC PERIPHERAL NEUROPATHY (HCC): Status: ACTIVE | Noted: 2024-06-14

## 2024-06-14 PROBLEM — E10.65 TYPE 1 DIABETES MELLITUS WITH HYPERGLYCEMIA (HCC): Status: ACTIVE | Noted: 2024-06-14

## 2024-06-14 PROBLEM — L03.119 CELLULITIS OF FOOT: Status: ACTIVE | Noted: 2024-06-14

## 2024-06-14 PROBLEM — E78.5 DYSLIPIDEMIA: Status: ACTIVE | Noted: 2024-06-14

## 2024-06-14 LAB
ANION GAP SERPL CALCULATED.3IONS-SCNC: 13 MMOL/L (ref 9–17)
BASOPHILS # BLD: 0 K/UL (ref 0–0.2)
BASOPHILS NFR BLD: 1 % (ref 0–2)
BUN SERPL-MCNC: 22 MG/DL (ref 6–20)
CALCIUM SERPL-MCNC: 10.4 MG/DL (ref 8.6–10.4)
CHLORIDE SERPL-SCNC: 100 MMOL/L (ref 98–107)
CO2 SERPL-SCNC: 24 MMOL/L (ref 20–31)
CREAT SERPL-MCNC: 1.1 MG/DL (ref 0.7–1.2)
CRP SERPL HS-MCNC: 16.3 MG/L (ref 0–5)
EOSINOPHIL # BLD: 0.2 K/UL (ref 0–0.4)
EOSINOPHILS RELATIVE PERCENT: 2 % (ref 1–4)
ERYTHROCYTE [DISTWIDTH] IN BLOOD BY AUTOMATED COUNT: 12.4 % (ref 12.5–15.4)
ERYTHROCYTE [SEDIMENTATION RATE] IN BLOOD BY PHOTOMETRIC METHOD: 34 MM/HR (ref 0–20)
ETHANOL PERCENT: <0.01 %
ETHANOLAMINE SERPL-MCNC: <10 MG/DL (ref 0–0.08)
GFR, ESTIMATED: 81 ML/MIN/1.73M2
GLUCOSE BLD-MCNC: 102 MG/DL (ref 75–110)
GLUCOSE SERPL-MCNC: 203 MG/DL (ref 70–99)
HCT VFR BLD AUTO: 44.8 % (ref 41–53)
HGB BLD-MCNC: 15.2 G/DL (ref 13.5–17.5)
LACTATE BLDV-SCNC: 1.5 MMOL/L (ref 0.5–2.2)
LYMPHOCYTES NFR BLD: 1.4 K/UL (ref 1–4.8)
LYMPHOCYTES RELATIVE PERCENT: 15 % (ref 24–44)
MCH RBC QN AUTO: 34.1 PG (ref 26–34)
MCHC RBC AUTO-ENTMCNC: 34 G/DL (ref 31–37)
MCV RBC AUTO: 100.5 FL (ref 80–100)
MONOCYTES NFR BLD: 0.8 K/UL (ref 0.1–1.2)
MONOCYTES NFR BLD: 9 % (ref 2–11)
NEUTROPHILS NFR BLD: 73 % (ref 36–66)
NEUTS SEG NFR BLD: 6.7 K/UL (ref 1.8–7.7)
PLATELET # BLD AUTO: 284 K/UL (ref 140–450)
PMV BLD AUTO: 8.2 FL (ref 6–12)
POTASSIUM SERPL-SCNC: 4 MMOL/L (ref 3.7–5.3)
RBC # BLD AUTO: 4.46 M/UL (ref 4.5–5.9)
SODIUM SERPL-SCNC: 137 MMOL/L (ref 135–144)
WBC OTHER # BLD: 9.2 K/UL (ref 3.5–11)

## 2024-06-14 PROCEDURE — 83605 ASSAY OF LACTIC ACID: CPT

## 2024-06-14 PROCEDURE — 73720 MRI LWR EXTREMITY W/O&W/DYE: CPT

## 2024-06-14 PROCEDURE — 6360000002 HC RX W HCPCS

## 2024-06-14 PROCEDURE — G0480 DRUG TEST DEF 1-7 CLASSES: HCPCS

## 2024-06-14 PROCEDURE — 96365 THER/PROPH/DIAG IV INF INIT: CPT

## 2024-06-14 PROCEDURE — 2580000003 HC RX 258

## 2024-06-14 PROCEDURE — 73630 X-RAY EXAM OF FOOT: CPT

## 2024-06-14 PROCEDURE — 85025 COMPLETE CBC W/AUTO DIFF WBC: CPT

## 2024-06-14 PROCEDURE — 1200000000 HC SEMI PRIVATE

## 2024-06-14 PROCEDURE — 36415 COLL VENOUS BLD VENIPUNCTURE: CPT

## 2024-06-14 PROCEDURE — 87040 BLOOD CULTURE FOR BACTERIA: CPT

## 2024-06-14 PROCEDURE — 96375 TX/PRO/DX INJ NEW DRUG ADDON: CPT

## 2024-06-14 PROCEDURE — 6360000004 HC RX CONTRAST MEDICATION

## 2024-06-14 PROCEDURE — 85652 RBC SED RATE AUTOMATED: CPT

## 2024-06-14 PROCEDURE — 6360000002 HC RX W HCPCS: Performed by: NURSE PRACTITIONER

## 2024-06-14 PROCEDURE — 99285 EMERGENCY DEPT VISIT HI MDM: CPT

## 2024-06-14 PROCEDURE — 2580000003 HC RX 258: Performed by: NURSE PRACTITIONER

## 2024-06-14 PROCEDURE — 86140 C-REACTIVE PROTEIN: CPT

## 2024-06-14 PROCEDURE — 80048 BASIC METABOLIC PNL TOTAL CA: CPT

## 2024-06-14 PROCEDURE — 82947 ASSAY GLUCOSE BLOOD QUANT: CPT

## 2024-06-14 PROCEDURE — A9579 GAD-BASE MR CONTRAST NOS,1ML: HCPCS

## 2024-06-14 PROCEDURE — 83036 HEMOGLOBIN GLYCOSYLATED A1C: CPT

## 2024-06-14 RX ORDER — GLIPIZIDE 5 MG/1
20 TABLET ORAL
Status: DISCONTINUED | OUTPATIENT
Start: 2024-06-14 | End: 2024-06-19 | Stop reason: HOSPADM

## 2024-06-14 RX ORDER — DEXTROSE MONOHYDRATE 100 MG/ML
INJECTION, SOLUTION INTRAVENOUS CONTINUOUS PRN
Status: DISCONTINUED | OUTPATIENT
Start: 2024-06-14 | End: 2024-06-19 | Stop reason: HOSPADM

## 2024-06-14 RX ORDER — SODIUM CHLORIDE 9 MG/ML
INJECTION, SOLUTION INTRAVENOUS PRN
Status: DISCONTINUED | OUTPATIENT
Start: 2024-06-14 | End: 2024-06-19 | Stop reason: HOSPADM

## 2024-06-14 RX ORDER — GLUCAGON 1 MG/ML
1 KIT INJECTION PRN
Status: DISCONTINUED | OUTPATIENT
Start: 2024-06-14 | End: 2024-06-19 | Stop reason: HOSPADM

## 2024-06-14 RX ORDER — MAGNESIUM SULFATE IN WATER 40 MG/ML
2000 INJECTION, SOLUTION INTRAVENOUS PRN
Status: DISCONTINUED | OUTPATIENT
Start: 2024-06-14 | End: 2024-06-19 | Stop reason: HOSPADM

## 2024-06-14 RX ORDER — SODIUM CHLORIDE 0.9 % (FLUSH) 0.9 %
5-40 SYRINGE (ML) INJECTION PRN
Status: DISCONTINUED | OUTPATIENT
Start: 2024-06-14 | End: 2024-06-19 | Stop reason: HOSPADM

## 2024-06-14 RX ORDER — POTASSIUM CHLORIDE 20 MEQ/1
40 TABLET, EXTENDED RELEASE ORAL PRN
Status: DISCONTINUED | OUTPATIENT
Start: 2024-06-14 | End: 2024-06-19 | Stop reason: HOSPADM

## 2024-06-14 RX ORDER — POLYETHYLENE GLYCOL 3350 17 G/17G
17 POWDER, FOR SOLUTION ORAL DAILY PRN
Status: DISCONTINUED | OUTPATIENT
Start: 2024-06-14 | End: 2024-06-19 | Stop reason: HOSPADM

## 2024-06-14 RX ORDER — ONDANSETRON 4 MG/1
4 TABLET, ORALLY DISINTEGRATING ORAL EVERY 8 HOURS PRN
Status: DISCONTINUED | OUTPATIENT
Start: 2024-06-14 | End: 2024-06-19 | Stop reason: HOSPADM

## 2024-06-14 RX ORDER — GABAPENTIN 100 MG/1
200 CAPSULE ORAL 3 TIMES DAILY
Status: DISCONTINUED | OUTPATIENT
Start: 2024-06-14 | End: 2024-06-19 | Stop reason: HOSPADM

## 2024-06-14 RX ORDER — SODIUM CHLORIDE 0.9 % (FLUSH) 0.9 %
5-40 SYRINGE (ML) INJECTION EVERY 12 HOURS SCHEDULED
Status: DISCONTINUED | OUTPATIENT
Start: 2024-06-14 | End: 2024-06-19 | Stop reason: HOSPADM

## 2024-06-14 RX ORDER — SODIUM CHLORIDE 9 MG/ML
INJECTION, SOLUTION INTRAVENOUS CONTINUOUS
Status: ACTIVE | OUTPATIENT
Start: 2024-06-14 | End: 2024-06-16

## 2024-06-14 RX ORDER — INSULIN LISPRO 100 [IU]/ML
0-4 INJECTION, SOLUTION INTRAVENOUS; SUBCUTANEOUS
Status: DISCONTINUED | OUTPATIENT
Start: 2024-06-14 | End: 2024-06-19 | Stop reason: HOSPADM

## 2024-06-14 RX ORDER — INSULIN LISPRO 100 [IU]/ML
0-4 INJECTION, SOLUTION INTRAVENOUS; SUBCUTANEOUS NIGHTLY
Status: DISCONTINUED | OUTPATIENT
Start: 2024-06-14 | End: 2024-06-19 | Stop reason: HOSPADM

## 2024-06-14 RX ORDER — ENOXAPARIN SODIUM 100 MG/ML
40 INJECTION SUBCUTANEOUS DAILY
Status: DISCONTINUED | OUTPATIENT
Start: 2024-06-14 | End: 2024-06-19 | Stop reason: HOSPADM

## 2024-06-14 RX ORDER — PIOGLITAZONEHYDROCHLORIDE 15 MG/1
30 TABLET ORAL NIGHTLY
Status: DISCONTINUED | OUTPATIENT
Start: 2024-06-14 | End: 2024-06-19 | Stop reason: HOSPADM

## 2024-06-14 RX ORDER — ACETAMINOPHEN 650 MG/1
650 SUPPOSITORY RECTAL EVERY 6 HOURS PRN
Status: DISCONTINUED | OUTPATIENT
Start: 2024-06-14 | End: 2024-06-19 | Stop reason: HOSPADM

## 2024-06-14 RX ORDER — LISINOPRIL 10 MG/1
10 TABLET ORAL DAILY
Status: DISCONTINUED | OUTPATIENT
Start: 2024-06-14 | End: 2024-06-19 | Stop reason: HOSPADM

## 2024-06-14 RX ORDER — ASPIRIN 81 MG/1
81 TABLET ORAL DAILY
Status: DISCONTINUED | OUTPATIENT
Start: 2024-06-14 | End: 2024-06-19 | Stop reason: HOSPADM

## 2024-06-14 RX ORDER — ACETAMINOPHEN 325 MG/1
650 TABLET ORAL EVERY 6 HOURS PRN
Status: DISCONTINUED | OUTPATIENT
Start: 2024-06-14 | End: 2024-06-19 | Stop reason: HOSPADM

## 2024-06-14 RX ORDER — ATORVASTATIN CALCIUM 40 MG/1
80 TABLET, FILM COATED ORAL NIGHTLY
Status: DISCONTINUED | OUTPATIENT
Start: 2024-06-14 | End: 2024-06-19 | Stop reason: HOSPADM

## 2024-06-14 RX ORDER — POTASSIUM CHLORIDE 7.45 MG/ML
10 INJECTION INTRAVENOUS PRN
Status: DISCONTINUED | OUTPATIENT
Start: 2024-06-14 | End: 2024-06-19 | Stop reason: HOSPADM

## 2024-06-14 RX ORDER — ONDANSETRON 2 MG/ML
4 INJECTION INTRAMUSCULAR; INTRAVENOUS EVERY 6 HOURS PRN
Status: DISCONTINUED | OUTPATIENT
Start: 2024-06-14 | End: 2024-06-19 | Stop reason: HOSPADM

## 2024-06-14 RX ORDER — SODIUM CHLORIDE 0.9 % (FLUSH) 0.9 %
10 SYRINGE (ML) INJECTION 2 TIMES DAILY
Status: DISCONTINUED | OUTPATIENT
Start: 2024-06-14 | End: 2024-06-19 | Stop reason: HOSPADM

## 2024-06-14 RX ADMIN — SODIUM CHLORIDE, PRESERVATIVE FREE 10 ML: 5 INJECTION INTRAVENOUS at 18:06

## 2024-06-14 RX ADMIN — VANCOMYCIN HYDROCHLORIDE 1750 MG: 1 INJECTION, POWDER, LYOPHILIZED, FOR SOLUTION INTRAVENOUS at 13:57

## 2024-06-14 RX ADMIN — SODIUM CHLORIDE, PRESERVATIVE FREE 10 ML: 5 INJECTION INTRAVENOUS at 19:37

## 2024-06-14 RX ADMIN — PIPERACILLIN AND TAZOBACTAM 3375 MG: 3; .375 INJECTION, POWDER, LYOPHILIZED, FOR SOLUTION INTRAVENOUS at 21:54

## 2024-06-14 RX ADMIN — GADOTERIDOL 19 ML: 279.3 INJECTION, SOLUTION INTRAVENOUS at 18:05

## 2024-06-14 RX ADMIN — PIPERACILLIN AND TAZOBACTAM 3375 MG: 3; .375 INJECTION, POWDER, LYOPHILIZED, FOR SOLUTION INTRAVENOUS at 12:26

## 2024-06-14 RX ADMIN — SODIUM CHLORIDE: 9 INJECTION, SOLUTION INTRAVENOUS at 16:26

## 2024-06-14 ASSESSMENT — PAIN SCALES - GENERAL: PAINLEVEL_OUTOF10: 5

## 2024-06-14 ASSESSMENT — PAIN - FUNCTIONAL ASSESSMENT: PAIN_FUNCTIONAL_ASSESSMENT: 0-10

## 2024-06-14 NOTE — ED PROVIDER NOTES
[place] EMERGENCY DEPARTMENT  EMERGENCY DEPARTMENT ENCOUNTER      Pt Name: Andi Nava  MRN: [id number]  Birthdate 1970  Date of evaluation: 6/14/2024  Provider: CAREY Sanchez CNP  6:06 PM    CHIEF COMPLAINT       Chief Complaint   Patient presents with    Foot Injury     6 weeks ago pt lost a toe nail on the 2nd digit  X1 week ago cut that toe in shower- swelling/black around nail  Went to urgent care 30 min pta- sent here just in case d/t having diabetes & possible need for IV abx          HISTORY OF PRESENT ILLNESS    Andi Nava is a 53 y.o. male who presents to the emergency department for evaluation of wound and injury to the left second toe.  Patient states 6 weeks ago he hit on the bedside table he did lose the nail, about a week and a half ago he cut it in the shower.  Patient is a diabetic he does have neuropathy and has decreased feeling here.  He went to urgent care and he was told to come to the emergency department due to him being diabetic.  The entire foot is swollen up to the ankle.  Significant foul odor to the wound.  Appearance is gangrenous, see media    HPI    Nursing Notes were reviewed.    REVIEW OF SYSTEMS       Review of Systems   All other systems reviewed and are negative.      Except as noted above the remainder of the review of systems was reviewed and negative.       PAST MEDICAL HISTORY     Past Medical History:   Diagnosis Date    Type 2 diabetes mellitus (HCC)          SURGICAL HISTORY     History reviewed. No pertinent surgical history.      CURRENT MEDICATIONS       Previous Medications    ASPIRIN LOW DOSE 81 MG EC TABLET    TAKE 1 TABLET BY MOUTH EVERY DAY    ATORVASTATIN (LIPITOR) 80 MG TABLET    TAKE 1 TABLET BY MOUTH EVERY DAY AT NIGHT    CLOPIDOGREL (PLAVIX) 75 MG TABLET    Take 1 tablet by mouth daily for 21 days    ESOMEPRAZOLE MAGNESIUM (NEXIUM) 20 MG PACK    Take 1 packet by mouth daily    GABAPENTIN (NEURONTIN) 100 MG CAPSULE    Take 2

## 2024-06-14 NOTE — H&P
Admission History and Physical  Foot and Ankle Surgery     Subjective     Chief Complaint: Right second digit wound     HPI:  Andi Nava is a 53 y.o. male seen at [facility] for a wound to the second digit with associated cellulitis.  Patient states that he stubbed his toe in the shower roughly 7 to 10 days ago and since that time is noticed an increase in swelling, redness and pain to the area.  He explains that he became increasingly concerned for infection to the area so presented to the hospital for further evaluation.  Denies any recent antibiotic use.  Denies outpatient follow-up with a podiatrist.  Denies any systemic signs of infection including nausea, vomiting, fever, chills or diarrhea.  No other pedal complaints at this time.     PCP is Anil Koehler MD     ROS:   Review of Systems   Constitutional:  Negative for chills and fever.   Gastrointestinal:  Negative for diarrhea, nausea and vomiting.   Musculoskeletal:  Positive for arthralgias, gait problem, joint swelling and myalgias.   Skin:  Positive for color change and wound.    Past Medical History   has a past medical history of Type 2 diabetes mellitus (HCC).    Past Surgical History   has no past surgical history on file.    Medications  Prior to Admission medications    Medication Sig Start Date End Date Taking? Authorizing Provider   pioglitazone (ACTOS) 30 MG tablet Take 1 tablet by mouth nightly at bedtime 5/29/24   Norma Ruiz MD   gabapentin (NEURONTIN) 100 MG capsule Take 2 capsules by mouth 3 times daily for 180 days. Intended supply: 90 days 6/6/24 12/3/24  Alon Cho MD   atorvastatin (LIPITOR) 80 MG tablet TAKE 1 TABLET BY MOUTH EVERY DAY AT NIGHT 4/12/24   Alon Cho MD   ASPIRIN LOW DOSE 81 MG EC tablet TAKE 1 TABLET BY MOUTH EVERY DAY 3/11/24   Alon Cho MD   JARDIANCE 25 MG tablet Take 1 tablet by mouth daily 6/13/23   Norma Ruiz MD   glipiZIDE (GLUCOTROL) 10 MG tablet TAKE 2  last 24 hours Vitals:  TEMPERATURE:  Temp  Av.9 °F (36.6 °C)  Min: 97.9 °F (36.6 °C)  Max: 97.9 °F (36.6 °C)    RESPIRATIONS RANGE: Resp  Av  Min: 16  Max: 16    PULSE RANGE: Pulse  Av  Min: 108  Max: 108    BLOOD PRESSURE RANGE:  Systolic (24hrs), Av , Min:147 , Max:147   ; Diastolic (24hrs), Av, Min:69, Max:69      PULSE OXIMETRY RANGE: SpO2  Av %  Min: 98 %  Max: 98 %  I&O:  No intake/output data recorded.    CBC:  Recent Labs     24  1224   WBC 9.2   HGB 15.2   HCT 44.8      CRP 16.3*        BMP:  Recent Labs     24  1224      K 4.0      CO2 24   BUN 22*   CREATININE 1.1   GLUCOSE 203*   CALCIUM 10.4        Coags:  No results for input(s): \"APTT\", \"PROT\", \"INR\" in the last 72 hours.    Lab Results   Component Value Date    LABA1C 7.8 (H) 2023     Lab Results   Component Value Date    SEDRATE 34 (H) 2024      Lab Results   Component Value Date    CRP 16.3 (H) 2024        Physical Exam:    General: A&Ox3, NAD  Heart: Regular rate and rhythm.   Lungs: Equal air entry.  No increased effort.   Abdomen: Soft, non-tender to palpation. Not distended.    Right Lower Extremity Physical Exam:  Vascular: DP and PT pulses are palpable. CFT <5 seconds to all digits.  Hair growth is absent to the level of the digits.  Edema present to right lower extremity.     Neuro: Saph/sural/SP/DP/plantar sensation diminished to light touch.     Musculoskeletal: Muscle strength is 5/5 to all lower extremity muscle groups. Gross deformity is absent.  Slight tenderness to palpation to right second digit wound.     Dermatologic:   Hyperkeratotic tissue noted to the distal aspect of the right second digit with suspected underlying wound.     After debridement:  Full thickness ulcer #1 located to the distal tuft of the second digit. Base is fibrogranular. Periwound skin is hyperkeratotic.  No drainage noted with associated mal odor. Erythema present with associated

## 2024-06-14 NOTE — PROGRESS NOTES
[facility]   Pharmacy Pharmacokinetic Monitoring Service - Vancomycin     Andi Nava is a 53 y.o. male starting on vancomycin therapy for SSTI. Pharmacy consulted by Adi Oleary  for monitoring and adjustment.    Target Concentration: Goal AUC/RILEY 400-600 mg*hr/L    Additional Antimicrobials: Zosyn     Pertinent Laboratory Values:   Wt Readings from Last 1 Encounters:   06/14/24 93 kg (205 lb)     Temp Readings from Last 1 Encounters:   06/14/24 97.9 °F (36.6 °C) (Oral)     Estimated Creatinine Clearance: 89 mL/min (based on SCr of 1.1 mg/dL).  Recent Labs     06/14/24  1224   CREATININE 1.1   BUN 22*   WBC 9.2     Procalcitonin:     Pertinent Cultures:  Culture Date Source Results        MRSA Nasal Swab: N/A. Non-respiratory infection.    Plan:  Dosing recommendations based on Bayesian software  Start vancomycin 1750 mg x1 in ED then 1250 mg every 12 hours  Anticipated AUC of 568 and trough concentration of 16.6 at steady state  Renal labs as indicated   Vancomycin concentration ordered for TBD @ TBD   Pharmacy will continue to monitor patient and adjust therapy as indicated    Thank you for the consult,  PAULIE FRIAS [facility]  6/14/2024 4:15 PM

## 2024-06-14 NOTE — CONSULTS
Consult Note  Foot and Ankle Surgery    Subjective     Chief Complaint: Right second digit wound    HPI:  Andi Nava is a 53 y.o. male seen at [facility] for a wound to the second digit with associated cellulitis.  Patient states that he stubbed his toe in the shower roughly 7 to 10 days ago and since that time is noticed an increase in swelling, redness and pain to the area.  He explains that he became increasingly concerned for infection to the area so presented to the hospital for further evaluation.  Denies any recent antibiotic use.  Denies outpatient follow-up with a podiatrist.  Denies any systemic signs of infection including nausea, vomiting, fever, chills or diarrhea.  No other pedal complaints at this time.    PCP is Anil Koehler MD    ROS:   Review of Systems   Constitutional:  Negative for chills and fever.   Gastrointestinal:  Negative for diarrhea, nausea and vomiting.   Musculoskeletal:  Positive for arthralgias, gait problem, joint swelling and myalgias.   Skin:  Positive for color change and wound.       Past Medical History   has a past medical history of Type 2 diabetes mellitus (HCC).    Past Surgical History   has no past surgical history on file.    Medications  Prior to Admission medications    Medication Sig Start Date End Date Taking? Authorizing Provider   pioglitazone (ACTOS) 30 MG tablet Take 1 tablet by mouth nightly at bedtime 5/29/24   Norma Ruiz MD   gabapentin (NEURONTIN) 100 MG capsule Take 2 capsules by mouth 3 times daily for 180 days. Intended supply: 90 days 6/6/24 12/3/24  Alon Cho MD   atorvastatin (LIPITOR) 80 MG tablet TAKE 1 TABLET BY MOUTH EVERY DAY AT NIGHT 4/12/24   Alon Cho MD   ASPIRIN LOW DOSE 81 MG EC tablet TAKE 1 TABLET BY MOUTH EVERY DAY 3/11/24   Alon Cho MD   JARDIANCE 25 MG tablet Take 1 tablet by mouth daily 6/13/23   Norma Ruiz MD   glipiZIDE (GLUCOTROL) 10 MG tablet TAKE 2 TABLETS BY MOUTH EVERY

## 2024-06-14 NOTE — ED PROVIDER NOTES
[facility] Emergency Department  [address]  [place] [address]  Phone: [phone]  Fax: [phone]      Attending Physician Attestation          CHIEF COMPLAINT       Chief Complaint   Patient presents with    Foot Injury     6 weeks ago pt lost a toe nail on the 2nd digit  X1 week ago cut that toe in shower- swelling/black around nail  Went to urgent care 30 min pta- sent here just in case d/t having diabetes & possible need for IV abx        DIAGNOSTIC RESULTS     LABS:  Labs Reviewed   CBC WITH AUTO DIFFERENTIAL - Abnormal; Notable for the following components:       Result Value    RBC 4.46 (*)     .5 (*)     MCH 34.1 (*)     RDW 12.4 (*)     Neutrophils % 73 (*)     Lymphocytes % 15 (*)     All other components within normal limits   BASIC METABOLIC PANEL - Abnormal; Notable for the following components:    Glucose 203 (*)     BUN 22 (*)     All other components within normal limits   C-REACTIVE PROTEIN - Abnormal; Notable for the following components:    CRP 16.3 (*)     All other components within normal limits   SEDIMENTATION RATE - Abnormal; Notable for the following components:    Sed Rate, Automated 34 (*)     All other components within normal limits   CULTURE, BLOOD 2   CULTURE, BLOOD 1   LACTIC ACID       All other labs were within normal range or not returned as of this dictation.    RADIOLOGY:  XR FOOT RIGHT (MIN 3 VIEWS)   Final Result   Periosteal reaction about the 1st proximal phalanx which can be seen in the   setting of osteomyelitis.  Further evaluation with MRI if clinically   indicated.         MRI FOOT LEFT W CONTRAST    (Results Pending)         EMERGENCY DEPARTMENT COURSE:   Vitals:    Vitals:    06/14/24 1137   BP: (!) 147/69   Pulse: (!) 108   Resp: 16   Temp: 97.9 °F (36.6 °C)   TempSrc: Oral   SpO2: 98%   Weight: 93 kg (205 lb)   Height: 1.854 m (6' 1\")     -------------------------  BP: (!) 147/69, Temp: 97.9 °F (36.6 °C), Pulse: (!)  Additional findings are as noted.    53-year-old male comes in today with left big toe infection.  X-ray showing osteomyelitis.  Podiatry was consulted.  Wound was debrided.  IV antibiotics given in the emergency department.  Hospitalist does not want to admit.  Podiatry will wait for MRI results and if negative will treat as an outpatient.      (Please note that portions of this note were completed with a voice recognition program.  Efforts were made to edit the dictations but occasionally words are mis-transcribed.)    Radha Mack MD  Attending Emergency Medicine Physician          Radha Mack MD  06/14/24 1314

## 2024-06-15 LAB
ALBUMIN SERPL-MCNC: 3.8 G/DL (ref 3.5–5.2)
ALBUMIN/GLOB SERPL: 1.6 {RATIO} (ref 1–2.5)
ALP SERPL-CCNC: 59 U/L (ref 40–129)
ALT SERPL-CCNC: 19 U/L (ref 5–41)
ANION GAP SERPL CALCULATED.3IONS-SCNC: 11 MMOL/L (ref 9–17)
AST SERPL-CCNC: 13 U/L
BASOPHILS # BLD: 0 K/UL (ref 0–0.2)
BASOPHILS NFR BLD: 1 % (ref 0–2)
BILIRUB SERPL-MCNC: 0.4 MG/DL (ref 0.3–1.2)
BUN SERPL-MCNC: 21 MG/DL (ref 6–20)
CALCIUM SERPL-MCNC: 9.1 MG/DL (ref 8.6–10.4)
CHLORIDE SERPL-SCNC: 106 MMOL/L (ref 98–107)
CO2 SERPL-SCNC: 24 MMOL/L (ref 20–31)
CREAT SERPL-MCNC: 1 MG/DL (ref 0.7–1.2)
CRP SERPL HS-MCNC: 27.8 MG/L (ref 0–5)
EOSINOPHIL # BLD: 0.2 K/UL (ref 0–0.4)
EOSINOPHILS RELATIVE PERCENT: 3 % (ref 1–4)
ERYTHROCYTE [DISTWIDTH] IN BLOOD BY AUTOMATED COUNT: 12.4 % (ref 12.5–15.4)
ERYTHROCYTE [SEDIMENTATION RATE] IN BLOOD BY PHOTOMETRIC METHOD: 23 MM/HR (ref 0–20)
EST. AVERAGE GLUCOSE BLD GHB EST-MCNC: 186 MG/DL
GFR, ESTIMATED: >90 ML/MIN/1.73M2
GLUCOSE BLD-MCNC: 100 MG/DL (ref 75–110)
GLUCOSE BLD-MCNC: 138 MG/DL (ref 75–110)
GLUCOSE BLD-MCNC: 152 MG/DL (ref 75–110)
GLUCOSE SERPL-MCNC: 156 MG/DL (ref 70–99)
HBA1C MFR BLD: 8.1 % (ref 4–6)
HCT VFR BLD AUTO: 38.3 % (ref 41–53)
HGB BLD-MCNC: 13.3 G/DL (ref 13.5–17.5)
LYMPHOCYTES NFR BLD: 1.2 K/UL (ref 1–4.8)
LYMPHOCYTES RELATIVE PERCENT: 19 % (ref 24–44)
MCH RBC QN AUTO: 34.7 PG (ref 26–34)
MCHC RBC AUTO-ENTMCNC: 34.7 G/DL (ref 31–37)
MCV RBC AUTO: 99.9 FL (ref 80–100)
MONOCYTES NFR BLD: 0.7 K/UL (ref 0.1–1.2)
MONOCYTES NFR BLD: 11 % (ref 2–11)
NEUTROPHILS NFR BLD: 66 % (ref 36–66)
NEUTS SEG NFR BLD: 4.2 K/UL (ref 1.8–7.7)
PLATELET # BLD AUTO: 215 K/UL (ref 140–450)
PMV BLD AUTO: 8 FL (ref 6–12)
POTASSIUM SERPL-SCNC: 4.3 MMOL/L (ref 3.7–5.3)
PROT SERPL-MCNC: 6.2 G/DL (ref 6.4–8.3)
RBC # BLD AUTO: 3.83 M/UL (ref 4.5–5.9)
SODIUM SERPL-SCNC: 141 MMOL/L (ref 135–144)
WBC OTHER # BLD: 6.3 K/UL (ref 3.5–11)

## 2024-06-15 PROCEDURE — 6370000000 HC RX 637 (ALT 250 FOR IP): Performed by: STUDENT IN AN ORGANIZED HEALTH CARE EDUCATION/TRAINING PROGRAM

## 2024-06-15 PROCEDURE — 99223 1ST HOSP IP/OBS HIGH 75: CPT | Performed by: STUDENT IN AN ORGANIZED HEALTH CARE EDUCATION/TRAINING PROGRAM

## 2024-06-15 PROCEDURE — 80053 COMPREHEN METABOLIC PANEL: CPT

## 2024-06-15 PROCEDURE — 6360000002 HC RX W HCPCS

## 2024-06-15 PROCEDURE — 82947 ASSAY GLUCOSE BLOOD QUANT: CPT

## 2024-06-15 PROCEDURE — 2580000003 HC RX 258

## 2024-06-15 PROCEDURE — 85652 RBC SED RATE AUTOMATED: CPT

## 2024-06-15 PROCEDURE — 86140 C-REACTIVE PROTEIN: CPT

## 2024-06-15 PROCEDURE — 85025 COMPLETE CBC W/AUTO DIFF WBC: CPT

## 2024-06-15 PROCEDURE — 1200000000 HC SEMI PRIVATE

## 2024-06-15 PROCEDURE — 36415 COLL VENOUS BLD VENIPUNCTURE: CPT

## 2024-06-15 RX ADMIN — EMPAGLIFLOZIN 25 MG: 10 TABLET, FILM COATED ORAL at 08:03

## 2024-06-15 RX ADMIN — PIOGLITAZONE 30 MG: 15 TABLET ORAL at 21:00

## 2024-06-15 RX ADMIN — GLIPIZIDE 20 MG: 5 TABLET ORAL at 16:18

## 2024-06-15 RX ADMIN — PIPERACILLIN AND TAZOBACTAM 3375 MG: 3; .375 INJECTION, POWDER, LYOPHILIZED, FOR SOLUTION INTRAVENOUS at 23:34

## 2024-06-15 RX ADMIN — METFORMIN HYDROCHLORIDE 1000 MG: 500 TABLET ORAL at 08:02

## 2024-06-15 RX ADMIN — PIPERACILLIN AND TAZOBACTAM 3375 MG: 3; .375 INJECTION, POWDER, LYOPHILIZED, FOR SOLUTION INTRAVENOUS at 07:59

## 2024-06-15 RX ADMIN — GLIPIZIDE 20 MG: 5 TABLET ORAL at 08:03

## 2024-06-15 RX ADMIN — METFORMIN HYDROCHLORIDE 1000 MG: 500 TABLET ORAL at 16:17

## 2024-06-15 RX ADMIN — GABAPENTIN 200 MG: 100 CAPSULE ORAL at 21:00

## 2024-06-15 RX ADMIN — LISINOPRIL 10 MG: 10 TABLET ORAL at 08:03

## 2024-06-15 RX ADMIN — ASPIRIN 81 MG: 81 TABLET, COATED ORAL at 08:03

## 2024-06-15 RX ADMIN — GABAPENTIN 200 MG: 100 CAPSULE ORAL at 08:04

## 2024-06-15 RX ADMIN — SODIUM CHLORIDE 1250 MG: 9 INJECTION, SOLUTION INTRAVENOUS at 02:56

## 2024-06-15 RX ADMIN — ATORVASTATIN CALCIUM 80 MG: 40 TABLET, FILM COATED ORAL at 21:00

## 2024-06-15 RX ADMIN — GABAPENTIN 200 MG: 100 CAPSULE ORAL at 14:32

## 2024-06-15 RX ADMIN — PIPERACILLIN AND TAZOBACTAM 3375 MG: 3; .375 INJECTION, POWDER, LYOPHILIZED, FOR SOLUTION INTRAVENOUS at 16:44

## 2024-06-15 RX ADMIN — SODIUM CHLORIDE, PRESERVATIVE FREE 10 ML: 5 INJECTION INTRAVENOUS at 07:45

## 2024-06-15 RX ADMIN — SODIUM CHLORIDE 1250 MG: 9 INJECTION, SOLUTION INTRAVENOUS at 14:35

## 2024-06-15 ASSESSMENT — PAIN SCALES - GENERAL
PAINLEVEL_OUTOF10: 0
PAINLEVEL_OUTOF10: 6

## 2024-06-15 ASSESSMENT — PAIN DESCRIPTION - DESCRIPTORS: DESCRIPTORS: TIGHTNESS;NUMBNESS

## 2024-06-15 ASSESSMENT — PAIN DESCRIPTION - LOCATION: LOCATION: FOOT

## 2024-06-15 ASSESSMENT — PAIN DESCRIPTION - ORIENTATION: ORIENTATION: RIGHT

## 2024-06-15 NOTE — PROGRESS NOTES
Progress Note  Foot and Ankle Surgery    Subjective     CC: 2nd toe wound, osteomyelitis     Interval:   Patient seen and examined at bedside during rounding this morning.  No acute events overnight. Afebrile, vital signs stable, pain controlled  Patient states that he did have his MRI imaging performed and is interested in results.  Patient denies any systemic signs of infection at this time.    Updated Labs:     WBC:   Lab Results   Component Value Date    WBC 6.3 06/15/2024     ESR:  Lab Results   Component Value Date    SEDRATE 34 (H) 06/14/2024     CRP:  Lab Results   Component Value Date    CRP 16.3 (H) 06/14/2024         HPI :  Andi Nava is a 53 y.o. male seen at [facility] for a wound to the second digit with associated cellulitis.  Patient states that he stubbed his toe in the shower roughly 7 to 10 days ago and since that time is noticed an increase in swelling, redness and pain to the area.  He explains that he became increasingly concerned for infection to the area so presented to the hospital for further evaluation.  Denies any recent antibiotic use.  Denies outpatient follow-up with a podiatrist.  Denies any systemic signs of infection including nausea, vomiting, fever, chills or diarrhea.  No other pedal complaints at this time.     PCP is Anil Koehler MD    ROS: Denies N/V/F/C/SOB/CP.  Otherwise negative except at stated in the HPI.     Medications:  Scheduled Meds:   sodium chloride flush  5-40 mL IntraVENous 2 times per day    enoxaparin  40 mg SubCUTAneous Daily    piperacillin-tazobactam  3,375 mg IntraVENous Q8H    vancomycin  1,250 mg IntraVENous Q12H    vancomycin (VANCOCIN) intermittent dosing (placeholder)   Other RX Placeholder    aspirin  81 mg Oral Daily    atorvastatin  80 mg Oral Nightly    gabapentin  200 mg Oral TID    glipiZIDE  20 mg Oral BID AC    lisinopril  10 mg Oral Daily    metFORMIN  1,000 mg Oral BID WC    pioglitazone  30 mg Oral Nightly    empagliflozin   evaluation with MRI if clinically   indicated.             Cultures:   Results       Procedure Component Value Units Date/Time    Blood Culture 1 [[id number]] Collected: 06/14/24 1222    Order Status: Completed Specimen: Blood Updated: 06/15/24 0428     Specimen Description .BLOOD     Special Requests left ac 20ml     Culture NO GROWTH 12 HOURS    Culture, Blood 2 [[id number]] Collected: 06/14/24 1211    Order Status: Completed Specimen: Blood Updated: 06/15/24 0432     Specimen Description .BLOOD     Special Requests right ac 20ml     Culture NO GROWTH 12 HOURS              Assessment   Andi Nava is a 53 y.o. male with   Diabetic ulceration down to layer of subcutaneous tissue, right second digit  Osteomyelitis, right second digit  Cellulitis, right foot  Right foot pain  Type 2 diabetes with peripheral neuropathy    Principal Problem:    Cellulitis of foot  Active Problems:    Diabetic peripheral neuropathy (HCC)    Type 1 diabetes mellitus with hyperglycemia (HCC)  Resolved Problems:    * No resolved hospital problems. *       Plan     Patient examined and evaluated at bedside   Treatment options discussed in detail with the patient  Patient to be admitted under podiatry for management of osteomyelitis  Radiographic imaging order/examined and reviewed with the patient in detail at bedside  No subcutaneous gas/emphysema noted.  No erosive changes noted to the distal aspect of the second digit concerning for osteomyelitis.  Order placed for MRI/CT to be obtained   Increase signal intensity on T2 imaging and decreased signal intensity on T1 imaging noted to the distal phalanx of the second digit concerning for osteomyelitis  Consults placed to the following services:  Internal medicine for medical management  Infectious disease for antibiotic management  Patient started on antibiotic therapy:  Zosyn and vancomycin  Antibiotics per infectious disease  Plan:  Continue IV antibiotics and daily dressing

## 2024-06-15 NOTE — PLAN OF CARE
Problem: Discharge Planning  Goal: Discharge to home or other facility with appropriate resources  6/15/2024 1106 by Omayra Ventura RN  Outcome: Progressing  6/15/2024 0055 by Donna aPyne RN  Outcome: Progressing  Patients questions and concerns addressed and answered.    Problem: Pain  Goal: Verbalizes/displays adequate comfort level or baseline comfort level  6/15/2024 1106 by Omayra Ventura RN  Outcome: Progressing  6/15/2024 0055 by Donna Payne RN  Outcome: Progressing  Pain scale preformed per protocol and pt treated for pain as documented. Education given.     Problem: Safety - Adult  Goal: Free from fall injury  6/15/2024 1106 by Omayra Ventura RN  Outcome: Progressing  6/15/2024 0055 by Donna Payne RN  Outcome: Progressing  Patient assessed for fall risk; fall precautions initiated. Patient instructed about safety devices. Environment kept free of clutter and adequate lighting provided.  Bed locked and in lowest position.  Call light within reach. Will continue to monitor.

## 2024-06-15 NOTE — CONSULTS
[facility]  Office: [phone]  Saeid Horan DO, Kuldeep Ascencio DO, Hal Su DO, Ant Rebolledo DO, Martinez Gallardo MD, Dinora Starks MD, Misael Granados MD, Julia Rolle MD,  Sal Pisano MD, Honorio Soto MD, Bunny Bryan DO, Darren Briones MD,  Justin Streeter DO, Bárbara Davis MD, Ray Iraheta MD, Eugene Horan DO, Tere Nicole MD,  Rashard Dee DO, Natasha Melara MD, Ching Valles MD, Radha Marte MD, Grady Guzman MD,  Piyush Ortiz MD, Taiwo Coyne MD, Yazmin Michelle MD, Pete Bear DO, Meme Vigil MD,  Sherif Gutierrez MD, Shirley Waterhouse, CNP,  Angelica Angela, CNP, Ana Rocha, CNP, Mohit Olson, CNP,  Chelsi Fischer, DNP, Julita Causey, CNP, Velia Navarro, CNP, Cecilia Kurtz, CNP, Agnes Pack, CNP, Kelly العراقي, CNP, Andi De La Fuente PA-C, Paula Gtz, CNS, Oanh Florence, CNP, Katelynn Giordano, CNP         [facility]   IN-PATIENT SERVICE   [facility]    CONSULTATION / HISTORY AND PHYSICAL EXAMINATION            Date:   6/15/2024  Patient name:  Andi Nava  Date of admission:  6/14/2024 11:31 AM  MRN:   [id number]  Account:  [id number]  YOB: 1970  PCP:    Anil Koehler MD  Room:   [facility]  Code Status:    Full Code    Physician Requesting Consult: Norberto Canas DPM    Reason for Consult: Medical management    History Obtained From:     patient, electronic medical record    History of Present Illness:     Andi Nava is a 53 y.o. Non- / non  male who presents with Foot Injury (6 weeks ago pt lost a toe nail on the 2nd digit/X1 week ago cut that toe in shower- swelling/black around nail/Went to urgent care 30 min pta- sent here just in case d/t having diabetes & possible need for IV abx )   and is admitted to the hospital for the management of Cellulitis of foot.    Patient with past medical history of type II DM, diabetic peripheral neuropathy admitted under podiatry service for  Collection Time: 06/14/24 12:24 PM   Result Value Ref Range    CRP 16.3 (H) 0.0 - 5.0 mg/L   Sedimentation Rate    Collection Time: 06/14/24 12:24 PM   Result Value Ref Range    Sed Rate, Automated 34 (H) 0 - 20 mm/Hr   Ethanol    Collection Time: 06/14/24 12:24 PM   Result Value Ref Range    Ethanol Lvl <10 <10 mg/dL    Ethanol percent <0.010 <0.010 %   Hemoglobin A1C    Collection Time: 06/14/24 12:24 PM   Result Value Ref Range    Hemoglobin A1C 8.1 (H) 4.0 - 6.0 %    Estimated Avg Glucose 186 mg/dL   POC Glucose Fingerstick    Collection Time: 06/14/24  8:13 PM   Result Value Ref Range    POC Glucose 102 75 - 110 mg/dL   Comprehensive Metabolic Panel w/ Reflex to MG    Collection Time: 06/15/24  6:51 AM   Result Value Ref Range    Sodium 141 135 - 144 mmol/L    Potassium 4.3 3.7 - 5.3 mmol/L    Chloride 106 98 - 107 mmol/L    CO2 24 20 - 31 mmol/L    Anion Gap 11 9 - 17 mmol/L    Glucose 156 (H) 70 - 99 mg/dL    BUN 21 (H) 6 - 20 mg/dL    Creatinine 1.0 0.7 - 1.2 mg/dL    Est, Glom Filt Rate >90 >60 mL/min/1.73m2    Calcium 9.1 8.6 - 10.4 mg/dL    Total Protein 6.2 (L) 6.4 - 8.3 g/dL    Albumin 3.8 3.5 - 5.2 g/dL    Albumin/Globulin Ratio 1.6 1.0 - 2.5    Total Bilirubin 0.4 0.3 - 1.2 mg/dL    Alkaline Phosphatase 59 40 - 129 U/L    ALT 19 5 - 41 U/L    AST 13 <40 U/L   CBC with Auto Differential    Collection Time: 06/15/24  6:51 AM   Result Value Ref Range    WBC 6.3 3.5 - 11.0 k/uL    RBC 3.83 (L) 4.5 - 5.9 m/uL    Hemoglobin 13.3 (L) 13.5 - 17.5 g/dL    Hematocrit 38.3 (L) 41 - 53 %    MCV 99.9 80 - 100 fL    MCH 34.7 (H) 26 - 34 pg    MCHC 34.7 31 - 37 g/dL    RDW 12.4 (L) 12.5 - 15.4 %    Platelets 215 140 - 450 k/uL    MPV 8.0 6.0 - 12.0 fL    Neutrophils % 66 36 - 66 %    Lymphocytes % 19 (L) 24 - 44 %    Monocytes % 11 2 - 11 %    Eosinophils % 3 1 - 4 %    Basophils % 1 0 - 2 %    Neutrophils Absolute 4.20 1.8 - 7.7 k/uL    Lymphocytes Absolute 1.20 1.0 - 4.8 k/uL    Monocytes Absolute 0.70 0.1 - 1.2

## 2024-06-16 LAB
ALBUMIN SERPL-MCNC: 4.2 G/DL (ref 3.5–5.2)
ALBUMIN/GLOB SERPL: 1.4 {RATIO} (ref 1–2.5)
ALP SERPL-CCNC: 64 U/L (ref 40–129)
ALT SERPL-CCNC: 19 U/L (ref 5–41)
ANION GAP SERPL CALCULATED.3IONS-SCNC: 12 MMOL/L (ref 9–17)
AST SERPL-CCNC: 15 U/L
BASOPHILS # BLD: 0 K/UL (ref 0–0.2)
BASOPHILS NFR BLD: 1 % (ref 0–2)
BILIRUB SERPL-MCNC: 0.4 MG/DL (ref 0.3–1.2)
BUN SERPL-MCNC: 20 MG/DL (ref 6–20)
CALCIUM SERPL-MCNC: 9.8 MG/DL (ref 8.6–10.4)
CHLORIDE SERPL-SCNC: 104 MMOL/L (ref 98–107)
CO2 SERPL-SCNC: 25 MMOL/L (ref 20–31)
CREAT SERPL-MCNC: 0.9 MG/DL (ref 0.7–1.2)
CRP SERPL HS-MCNC: 16.8 MG/L (ref 0–5)
DATE LAST DOSE: NORMAL
EOSINOPHIL # BLD: 0.2 K/UL (ref 0–0.4)
EOSINOPHILS RELATIVE PERCENT: 3 % (ref 1–4)
ERYTHROCYTE [DISTWIDTH] IN BLOOD BY AUTOMATED COUNT: 12.5 % (ref 12.5–15.4)
ERYTHROCYTE [SEDIMENTATION RATE] IN BLOOD BY PHOTOMETRIC METHOD: 9 MM/HR (ref 0–20)
GFR, ESTIMATED: >90 ML/MIN/1.73M2
GLUCOSE BLD-MCNC: 110 MG/DL (ref 75–110)
GLUCOSE BLD-MCNC: 113 MG/DL (ref 75–110)
GLUCOSE BLD-MCNC: 202 MG/DL (ref 75–110)
GLUCOSE BLD-MCNC: 98 MG/DL (ref 75–110)
GLUCOSE SERPL-MCNC: 97 MG/DL (ref 70–99)
HCT VFR BLD AUTO: 42 % (ref 41–53)
HGB BLD-MCNC: 14.5 G/DL (ref 13.5–17.5)
LYMPHOCYTES NFR BLD: 1.1 K/UL (ref 1–4.8)
LYMPHOCYTES RELATIVE PERCENT: 19 % (ref 24–44)
MCH RBC QN AUTO: 34.9 PG (ref 26–34)
MCHC RBC AUTO-ENTMCNC: 34.5 G/DL (ref 31–37)
MCV RBC AUTO: 101 FL (ref 80–100)
MONOCYTES NFR BLD: 0.6 K/UL (ref 0.1–1.2)
MONOCYTES NFR BLD: 10 % (ref 2–11)
NEUTROPHILS NFR BLD: 67 % (ref 36–66)
NEUTS SEG NFR BLD: 4.1 K/UL (ref 1.8–7.7)
PLATELET # BLD AUTO: 247 K/UL (ref 140–450)
PMV BLD AUTO: 7.6 FL (ref 6–12)
POTASSIUM SERPL-SCNC: 4.2 MMOL/L (ref 3.7–5.3)
PROT SERPL-MCNC: 7.1 G/DL (ref 6.4–8.3)
RBC # BLD AUTO: 4.15 M/UL (ref 4.5–5.9)
SODIUM SERPL-SCNC: 141 MMOL/L (ref 135–144)
TME LAST DOSE: NORMAL H
VANCOMYCIN DOSE: NORMAL MG
VANCOMYCIN SERPL-MCNC: 10.4 UG/ML
WBC OTHER # BLD: 6 K/UL (ref 3.5–11)

## 2024-06-16 PROCEDURE — 1200000000 HC SEMI PRIVATE

## 2024-06-16 PROCEDURE — 86140 C-REACTIVE PROTEIN: CPT

## 2024-06-16 PROCEDURE — 80053 COMPREHEN METABOLIC PANEL: CPT

## 2024-06-16 PROCEDURE — 82947 ASSAY GLUCOSE BLOOD QUANT: CPT

## 2024-06-16 PROCEDURE — 85025 COMPLETE CBC W/AUTO DIFF WBC: CPT

## 2024-06-16 PROCEDURE — 99232 SBSQ HOSP IP/OBS MODERATE 35: CPT | Performed by: STUDENT IN AN ORGANIZED HEALTH CARE EDUCATION/TRAINING PROGRAM

## 2024-06-16 PROCEDURE — 6360000002 HC RX W HCPCS

## 2024-06-16 PROCEDURE — 85652 RBC SED RATE AUTOMATED: CPT

## 2024-06-16 PROCEDURE — 36415 COLL VENOUS BLD VENIPUNCTURE: CPT

## 2024-06-16 PROCEDURE — 80202 ASSAY OF VANCOMYCIN: CPT

## 2024-06-16 PROCEDURE — 6370000000 HC RX 637 (ALT 250 FOR IP): Performed by: STUDENT IN AN ORGANIZED HEALTH CARE EDUCATION/TRAINING PROGRAM

## 2024-06-16 PROCEDURE — 2580000003 HC RX 258

## 2024-06-16 RX ADMIN — GABAPENTIN 200 MG: 100 CAPSULE ORAL at 15:58

## 2024-06-16 RX ADMIN — METFORMIN HYDROCHLORIDE 1000 MG: 500 TABLET ORAL at 08:23

## 2024-06-16 RX ADMIN — GABAPENTIN 200 MG: 100 CAPSULE ORAL at 21:37

## 2024-06-16 RX ADMIN — PIPERACILLIN AND TAZOBACTAM 3375 MG: 3; .375 INJECTION, POWDER, LYOPHILIZED, FOR SOLUTION INTRAVENOUS at 18:07

## 2024-06-16 RX ADMIN — ASPIRIN 81 MG: 81 TABLET, COATED ORAL at 08:23

## 2024-06-16 RX ADMIN — SODIUM CHLORIDE, PRESERVATIVE FREE 10 ML: 5 INJECTION INTRAVENOUS at 21:39

## 2024-06-16 RX ADMIN — ATORVASTATIN CALCIUM 80 MG: 40 TABLET, FILM COATED ORAL at 21:38

## 2024-06-16 RX ADMIN — GABAPENTIN 200 MG: 100 CAPSULE ORAL at 08:24

## 2024-06-16 RX ADMIN — GLIPIZIDE 20 MG: 5 TABLET ORAL at 06:19

## 2024-06-16 RX ADMIN — PIOGLITAZONE 30 MG: 15 TABLET ORAL at 21:38

## 2024-06-16 RX ADMIN — LISINOPRIL 10 MG: 10 TABLET ORAL at 08:23

## 2024-06-16 RX ADMIN — SODIUM CHLORIDE 1250 MG: 9 INJECTION, SOLUTION INTRAVENOUS at 15:57

## 2024-06-16 RX ADMIN — EMPAGLIFLOZIN 25 MG: 10 TABLET, FILM COATED ORAL at 08:24

## 2024-06-16 RX ADMIN — SODIUM CHLORIDE 1250 MG: 9 INJECTION, SOLUTION INTRAVENOUS at 04:01

## 2024-06-16 RX ADMIN — PIPERACILLIN AND TAZOBACTAM 3375 MG: 3; .375 INJECTION, POWDER, LYOPHILIZED, FOR SOLUTION INTRAVENOUS at 08:23

## 2024-06-16 RX ADMIN — GLIPIZIDE 20 MG: 5 TABLET ORAL at 15:58

## 2024-06-16 ASSESSMENT — PAIN DESCRIPTION - ORIENTATION: ORIENTATION: RIGHT

## 2024-06-16 ASSESSMENT — PAIN SCALES - GENERAL: PAINLEVEL_OUTOF10: 3

## 2024-06-16 ASSESSMENT — PAIN DESCRIPTION - DESCRIPTORS: DESCRIPTORS: TIGHTNESS;NUMBNESS

## 2024-06-16 ASSESSMENT — PAIN DESCRIPTION - LOCATION: LOCATION: FOOT

## 2024-06-16 NOTE — PLAN OF CARE
Problem: Discharge Planning  Goal: Discharge to home or other facility with appropriate resources  6/16/2024 0843 by Omayra Ventura RN  Outcome: Progressing  6/16/2024 0232 by Wojciech Santiago RN  Outcome: Progressing  Flowsheets (Taken 6/15/2024 2000)  Discharge to home or other facility with appropriate resources:   Identify barriers to discharge with patient and caregiver   Arrange for needed discharge resources and transportation as appropriate   Identify discharge learning needs (meds, wound care, etc)    Patients questions and concerns addressed and answered.    Problem: Pain  Goal: Verbalizes/displays adequate comfort level or baseline comfort level  6/16/2024 0843 by Omayra Ventura RN  Outcome: Progressing  6/16/2024 0232 by Wojciech Santiago RN  Outcome: Progressing   Pain scale preformed per protocol and pt treated for pain as documented. Education given.     Problem: Safety - Adult  Goal: Free from fall injury  6/16/2024 0843 by Omayra Ventura RN  Outcome: Progressing  6/16/2024 0232 by Wojciech Santiago RN  Outcome: Progressing  Patient assessed for fall risk; fall precautions initiated. Patient instructed about safety devices. Environment kept free of clutter and adequate lighting provided.  Bed locked and in lowest position.  Call light within reach. Will continue to monitor.

## 2024-06-16 NOTE — PROGRESS NOTES
received phone call from surgery scheduling. Pt is scheduled for surgery 6/17 1pm with Dr. Taylor.

## 2024-06-16 NOTE — PROGRESS NOTES
[facility]  Office: [phone]  Saeid Horan DO, Kuldeep Ascencio DO, Hal Su DO, Ant Rebolledo DO, Martinez Gallardo MD, Dinora Starks MD, Misael Granados MD, Julia Rolle MD,  Sal Pisano MD, Honorio Soto MD, Darren Briones MD,  Justin Streeter DO, Bárbara Davis MD, Ray Iraheta MD, Eugene Horan DO, Tere Nicole MD,  Rashard Dee DO, Natasha Melara MD, Ching Valles MD, Radha Marte MD, Grady Guzman MD,  Piyush Ortiz MD, Taiwo Coyne MD, Yazmin Michelle MD, Joe Rodriguez MD, Ammon Colbert MD, Abbey Ulloa MD, Pete Bear DO, Bunny Bryan DO, Meme Vigil MD,  Sherif Gutierrez MD, Shirley Waterhouse, CNP,  Angelica Angela CNP, Mohit Olson, CNP,  Chelsi Fischer, DNP, Julita Causey, CNP, Velia Navarro, CNP, Agnes Pack CNP, Kelly العراقي, CNP, Adina Spann, PA-C, Gladys Woodall PA-C, Haley Ballard, CNP, Donna Lowry, CNP, Mirian Rosales, CNP, Ana Rocha, CNP, Cecilia Kurtz, CNP, Paula Gtz, CNS, Oanh Florence, CNP, Katelynn Giordano CNP, Tracy Schwab, CNP         [facility]   IN-PATIENT SERVICE   [facility]    Progress Note    6/16/2024    7:37 AM    Name:   Andi Nava  MRN:     [id number]     Acct:      [id number]   Room:   318/318-01   Day:  2  Admit Date:  6/14/2024 11:31 AM    PCP:   Anil Koehler MD  Code Status:  Full Code    Subjective:     C/C:   Chief Complaint   Patient presents with    Foot Injury     6 weeks ago pt lost a toe nail on the 2nd digit  X1 week ago cut that toe in shower- swelling/black around nail  Went to urgent care 30 min pta- sent here just in case d/t having diabetes & possible need for IV abx      Interval History Status: not changed.   Patient seen and examined bedside this morning.  Blood pressure little high this morning.  Labs reviewed.  CRP trending down.  Blood glucose well-controlled.  Antibiotic management per primary.    Brief History:     53 y.o. Non- /  arthralgias and back pain.        RT FOOT cellulitis   Skin:  Positive for wound. Negative for rash.        Right foot wound   Neurological:  Negative for dizziness, seizures, light-headedness and headaches.   Psychiatric/Behavioral:  Negative for agitation and behavioral problems.        Medications:     Allergies:  No Known Allergies    Current Meds:   Scheduled Meds:    sodium chloride flush  5-40 mL IntraVENous 2 times per day    enoxaparin  40 mg SubCUTAneous Daily    piperacillin-tazobactam  3,375 mg IntraVENous Q8H    vancomycin  1,250 mg IntraVENous Q12H    vancomycin (VANCOCIN) intermittent dosing (placeholder)   Other RX Placeholder    aspirin  81 mg Oral Daily    atorvastatin  80 mg Oral Nightly    gabapentin  200 mg Oral TID    glipiZIDE  20 mg Oral BID AC    lisinopril  10 mg Oral Daily    metFORMIN  1,000 mg Oral BID WC    pioglitazone  30 mg Oral Nightly    empagliflozin  25 mg Oral Daily    insulin lispro  0-4 Units SubCUTAneous TID WC    insulin lispro  0-4 Units SubCUTAneous Nightly    sodium chloride flush  10 mL IntraVENous BID     Continuous Infusions:    sodium chloride      sodium chloride 75 mL/hr at 06/14/24 1626    dextrose       PRN Meds: sodium chloride flush, sodium chloride, potassium chloride **OR** potassium alternative oral replacement **OR** potassium chloride, magnesium sulfate, ondansetron **OR** ondansetron, polyethylene glycol, acetaminophen **OR** acetaminophen, glucose, dextrose bolus **OR** dextrose bolus, glucagon (rDNA), dextrose    Data:     Past Medical History:   has a past medical history of Type 2 diabetes mellitus (HCC).    Social History:   reports that he has never smoked. He has never been exposed to tobacco smoke. He has never used smokeless tobacco. He reports that he does not currently use alcohol. He reports that he does not use drugs.     Family History: History reviewed. No pertinent family history.    Vitals:  BP (!) 149/89   Pulse 78   Temp 97.7 °F (36.5

## 2024-06-16 NOTE — PLAN OF CARE
Problem: Discharge Planning  Goal: Discharge to home or other facility with appropriate resources  Outcome: Progressing  Flowsheets (Taken 6/15/2024 2000)  Discharge to home or other facility with appropriate resources:   Identify barriers to discharge with patient and caregiver   Arrange for needed discharge resources and transportation as appropriate   Identify discharge learning needs (meds, wound care, etc)     Problem: Pain  Goal: Verbalizes/displays adequate comfort level or baseline comfort level  Outcome: Progressing     Problem: Safety - Adult  Goal: Free from fall injury  Outcome: Progressing

## 2024-06-16 NOTE — PROGRESS NOTES
Progress Note  Foot and Ankle Surgery    Subjective     CC: 2nd toe wound, osteomyelitis     Interval:   -Patient seen and examined at bedside this afternoon  -No acute events overnight.  Vital signs stable at this time.  -Patient denies any pain to his foot.  Denies any systemic signs of infection  -MRI reviewed at length with wife over the phone.  -Further discussion was had with patient and his wife over the phone about further surgical intervention during this admission.  All questions answered to the patient and his wife satisfaction.    Updated Labs:     WBC:   Lab Results   Component Value Date    WBC 6.3 06/15/2024     ESR:  Lab Results   Component Value Date    SEDRATE 34 (H) 06/14/2024     CRP:  Lab Results   Component Value Date    CRP 16.3 (H) 06/14/2024         HPI :  Andi Nava is a 53 y.o. male seen at [facility] for a wound to the second digit with associated cellulitis.  Patient states that he stubbed his toe in the shower roughly 7 to 10 days ago and since that time is noticed an increase in swelling, redness and pain to the area.  He explains that he became increasingly concerned for infection to the area so presented to the hospital for further evaluation.  Denies any recent antibiotic use.  Denies outpatient follow-up with a podiatrist.  Denies any systemic signs of infection including nausea, vomiting, fever, chills or diarrhea.  No other pedal complaints at this time.     PCP is Anil Keohler MD    ROS: Denies N/V/F/C/SOB/CP.  Otherwise negative except at stated in the HPI.     Medications:  Scheduled Meds:   sodium chloride flush  5-40 mL IntraVENous 2 times per day    enoxaparin  40 mg SubCUTAneous Daily    piperacillin-tazobactam  3,375 mg IntraVENous Q8H    vancomycin  1,250 mg IntraVENous Q12H    vancomycin (VANCOCIN) intermittent dosing (placeholder)   Other RX Placeholder    aspirin  81 mg Oral Daily    atorvastatin  80 mg Oral Nightly    gabapentin  200 mg Oral TID

## 2024-06-16 NOTE — PROGRESS NOTES
[facility]   Pharmacy Pharmacokinetic Monitoring Service - Vancomycin    Consulting Provider: Dr. Oleary   Indication: SSTI  Target Concentration: Goal AUC/RILEY 400-600 mg*hr/L  Day of Therapy: 3  Additional Antimicrobials: Zosyn    Pertinent Laboratory Values:   Wt Readings from Last 1 Encounters:   06/14/24 95.5 kg (210 lb 8.6 oz)     Temp Readings from Last 1 Encounters:   06/16/24 97.7 °F (36.5 °C) (Oral)     Estimated Creatinine Clearance: 115 mL/min (based on SCr of 0.9 mg/dL).  Recent Labs     06/15/24  0651 06/16/24  0818   CREATININE 1.0 0.9   BUN 21* 20   WBC 6.3 6.0     Procalcitonin: n/a    Pertinent Cultures:  Culture Date Source Results   06.14.24 Blood x 2 NGTD   MRSA Nasal Swab: N/A. Non-respiratory infection.    Recent vancomycin administrations                     vancomycin (VANCOCIN) 1,250 mg in sodium chloride 0.9 % 250 mL IVPB ([id number]) (mg) 1,250 mg New Bag 06/16/24 1557     1,250 mg New Bag  0401     1,250 mg New Bag 06/15/24 1435     1,250 mg New Bag  0256    vancomycin (VANCOCIN) 1,750 mg in sodium chloride 0.9 % 500 mL IVPB (mg) 1,750 mg New Bag 06/14/24 1357                    Assessment:  Date/Time Current Dose Concentration Timing of Concentration (h) AUC   06.16.24 1250 mg IV q12h 10.4 10 h 51 m 458   Note: Serum concentrations collected for AUC dosing may appear elevated if collected in close proximity to the dose administered, this is not necessarily an indication of toxicity    Plan:  Current dosing regimen is therapeutic  Continue current dose  Pharmacy will continue to monitor patient and adjust therapy as indicated    Thank you for the consult,  Renan Hutchins [facility]  6/16/2024 4:03 PM

## 2024-06-17 ENCOUNTER — ANESTHESIA EVENT (OUTPATIENT)
Dept: OPERATING ROOM | Age: 54
End: 2024-06-17
Payer: COMMERCIAL

## 2024-06-17 LAB
ALBUMIN SERPL-MCNC: 3.9 G/DL (ref 3.5–5.2)
ALBUMIN/GLOB SERPL: 1.4 {RATIO} (ref 1–2.5)
ALP SERPL-CCNC: 67 U/L (ref 40–129)
ALT SERPL-CCNC: 18 U/L (ref 5–41)
ANION GAP SERPL CALCULATED.3IONS-SCNC: 11 MMOL/L (ref 9–17)
AST SERPL-CCNC: 15 U/L
BASOPHILS # BLD: 0 K/UL (ref 0–0.2)
BASOPHILS NFR BLD: 1 % (ref 0–2)
BILIRUB SERPL-MCNC: 0.3 MG/DL (ref 0.3–1.2)
BUN SERPL-MCNC: 23 MG/DL (ref 6–20)
CALCIUM SERPL-MCNC: 9.7 MG/DL (ref 8.6–10.4)
CHLORIDE SERPL-SCNC: 109 MMOL/L (ref 98–107)
CO2 SERPL-SCNC: 23 MMOL/L (ref 20–31)
CREAT SERPL-MCNC: 1 MG/DL (ref 0.7–1.2)
CRP SERPL HS-MCNC: 13.5 MG/L (ref 0–5)
EOSINOPHIL # BLD: 0.2 K/UL (ref 0–0.4)
EOSINOPHILS RELATIVE PERCENT: 3 % (ref 1–4)
ERYTHROCYTE [DISTWIDTH] IN BLOOD BY AUTOMATED COUNT: 12.3 % (ref 12.5–15.4)
ERYTHROCYTE [SEDIMENTATION RATE] IN BLOOD BY PHOTOMETRIC METHOD: 13 MM/HR (ref 0–20)
GFR, ESTIMATED: >90 ML/MIN/1.73M2
GLUCOSE BLD-MCNC: 101 MG/DL (ref 75–110)
GLUCOSE BLD-MCNC: 122 MG/DL (ref 75–110)
GLUCOSE BLD-MCNC: 176 MG/DL (ref 75–110)
GLUCOSE BLD-MCNC: 186 MG/DL (ref 75–110)
GLUCOSE SERPL-MCNC: 136 MG/DL (ref 70–99)
HCT VFR BLD AUTO: 41.2 % (ref 41–53)
HGB BLD-MCNC: 14.3 G/DL (ref 13.5–17.5)
LYMPHOCYTES NFR BLD: 1.1 K/UL (ref 1–4.8)
LYMPHOCYTES RELATIVE PERCENT: 19 % (ref 24–44)
MCH RBC QN AUTO: 35.2 PG (ref 26–34)
MCHC RBC AUTO-ENTMCNC: 34.8 G/DL (ref 31–37)
MCV RBC AUTO: 101.1 FL (ref 80–100)
MONOCYTES NFR BLD: 0.6 K/UL (ref 0.1–1.2)
MONOCYTES NFR BLD: 11 % (ref 2–11)
NEUTROPHILS NFR BLD: 66 % (ref 36–66)
NEUTS SEG NFR BLD: 3.7 K/UL (ref 1.8–7.7)
PLATELET # BLD AUTO: 241 K/UL (ref 140–450)
PMV BLD AUTO: 8 FL (ref 6–12)
POTASSIUM SERPL-SCNC: 4.6 MMOL/L (ref 3.7–5.3)
PROT SERPL-MCNC: 6.7 G/DL (ref 6.4–8.3)
RBC # BLD AUTO: 4.07 M/UL (ref 4.5–5.9)
SODIUM SERPL-SCNC: 143 MMOL/L (ref 135–144)
WBC OTHER # BLD: 5.6 K/UL (ref 3.5–11)

## 2024-06-17 PROCEDURE — 86140 C-REACTIVE PROTEIN: CPT

## 2024-06-17 PROCEDURE — 85652 RBC SED RATE AUTOMATED: CPT

## 2024-06-17 PROCEDURE — 99223 1ST HOSP IP/OBS HIGH 75: CPT | Performed by: INTERNAL MEDICINE

## 2024-06-17 PROCEDURE — 36415 COLL VENOUS BLD VENIPUNCTURE: CPT

## 2024-06-17 PROCEDURE — 82947 ASSAY GLUCOSE BLOOD QUANT: CPT

## 2024-06-17 PROCEDURE — 2580000003 HC RX 258

## 2024-06-17 PROCEDURE — 85025 COMPLETE CBC W/AUTO DIFF WBC: CPT

## 2024-06-17 PROCEDURE — 1200000000 HC SEMI PRIVATE

## 2024-06-17 PROCEDURE — 99232 SBSQ HOSP IP/OBS MODERATE 35: CPT | Performed by: STUDENT IN AN ORGANIZED HEALTH CARE EDUCATION/TRAINING PROGRAM

## 2024-06-17 PROCEDURE — 6360000002 HC RX W HCPCS

## 2024-06-17 PROCEDURE — 80053 COMPREHEN METABOLIC PANEL: CPT

## 2024-06-17 PROCEDURE — 6370000000 HC RX 637 (ALT 250 FOR IP): Performed by: STUDENT IN AN ORGANIZED HEALTH CARE EDUCATION/TRAINING PROGRAM

## 2024-06-17 RX ADMIN — GLIPIZIDE 20 MG: 5 TABLET ORAL at 11:28

## 2024-06-17 RX ADMIN — PIPERACILLIN AND TAZOBACTAM 3375 MG: 3; .375 INJECTION, POWDER, LYOPHILIZED, FOR SOLUTION INTRAVENOUS at 00:39

## 2024-06-17 RX ADMIN — GLIPIZIDE 20 MG: 5 TABLET ORAL at 16:02

## 2024-06-17 RX ADMIN — GABAPENTIN 200 MG: 100 CAPSULE ORAL at 22:14

## 2024-06-17 RX ADMIN — SODIUM CHLORIDE, PRESERVATIVE FREE 10 ML: 5 INJECTION INTRAVENOUS at 22:19

## 2024-06-17 RX ADMIN — PIOGLITAZONE 30 MG: 15 TABLET ORAL at 22:14

## 2024-06-17 RX ADMIN — GABAPENTIN 200 MG: 100 CAPSULE ORAL at 11:24

## 2024-06-17 RX ADMIN — GABAPENTIN 200 MG: 100 CAPSULE ORAL at 14:38

## 2024-06-17 RX ADMIN — PIPERACILLIN AND TAZOBACTAM 3375 MG: 3; .375 INJECTION, POWDER, LYOPHILIZED, FOR SOLUTION INTRAVENOUS at 18:15

## 2024-06-17 RX ADMIN — SODIUM CHLORIDE 1250 MG: 9 INJECTION, SOLUTION INTRAVENOUS at 16:07

## 2024-06-17 RX ADMIN — ATORVASTATIN CALCIUM 80 MG: 40 TABLET, FILM COATED ORAL at 22:14

## 2024-06-17 RX ADMIN — EMPAGLIFLOZIN 25 MG: 10 TABLET, FILM COATED ORAL at 11:26

## 2024-06-17 RX ADMIN — LISINOPRIL 10 MG: 10 TABLET ORAL at 11:25

## 2024-06-17 RX ADMIN — SODIUM CHLORIDE 1250 MG: 9 INJECTION, SOLUTION INTRAVENOUS at 04:54

## 2024-06-17 RX ADMIN — ASPIRIN 81 MG: 81 TABLET, COATED ORAL at 11:26

## 2024-06-17 RX ADMIN — PIPERACILLIN AND TAZOBACTAM 3375 MG: 3; .375 INJECTION, POWDER, LYOPHILIZED, FOR SOLUTION INTRAVENOUS at 10:17

## 2024-06-17 ASSESSMENT — ENCOUNTER SYMPTOMS
ROS SKIN COMMENTS: RIGHT FOOT WOUND
RHINORRHEA: 0
COUGH: 0
ABDOMINAL DISTENTION: 0
EYE ITCHING: 0
ABDOMINAL PAIN: 0
WHEEZING: 0
EYE DISCHARGE: 0
BACK PAIN: 0
SHORTNESS OF BREATH: 0

## 2024-06-17 NOTE — PROGRESS NOTES
SPIRITUAL CARE DEPARTMENT - [facility]  PROGRESS NOTE    Room # 318/318-01   Name: Andi Nava            Pentecostal: none    Reason for visit: Follow up    I visited the patient.    Admit Date & Time: 6/14/2024 11:31 AM    Assessment:  Andi Nava is a 53 y.o. male in the hospital because cellulitis. Upon entering the room Pt was welcoming and open to conversation. Friendly.      Intervention:  I introduced myself and my title as  I offered space for Pt  to express feelings, needs, and concerns and provided a ministry presence.  provided pastoral care to Pt. Provided support and encouragement.  offered empathic listening in light of change of care plan.     Outcome:  Pt was calm after visit. Pt expressed concern about the change of operation time and day. Expressed disappointment with the change of plan without notice.    Plan:  Chaplains will remain available to offer spiritual and emotional support as needed.    Electronically signed by Chaplain JASON, on 6/17/2024 at 7:21 PM.  Spiritual Care Department  [facility]      06/17/24 1918   Encounter Summary   Encounter Overview/Reason Spiritual/Emotional Needs   Service Provided For Patient   Referral/Consult From [facility] [place]   Support System Spouse;Children   Last Encounter  06/16/24   Complexity of Encounter Moderate   Begin Time 1745   End Time  1800   Total Time Calculated 15 min   Spiritual/Emotional needs   Type Spiritual Support;Emotional Distress   Grief, Loss, and Adjustments   Type Life Adjustments   Assessment/Intervention/Outcome   Assessment Calm;Coping   Intervention Active listening;Discussed meaning/purpose   Outcome Coping;Encouraged   Plan and Referrals   Plan/Referrals Continue to visit, (comment)

## 2024-06-17 NOTE — CARE COORDINATION
Case Management Assessment  Initial Evaluation    Date/Time of Evaluation: 6/17/2024 3:08 PM  Assessment Completed by: Jeanette Sharif RN    If patient is discharged prior to next notation, then this note serves as note for discharge by case management.    Patient Name: Andi Nava                   YOB: 1970  Diagnosis: Cellulitis of foot [L03.119]  Cellulitis of left foot [L03.116]  Osteomyelitis of second toe of left foot (HCC) [M86.9]                   Date / Time: 6/14/2024 11:31 AM    Patient Admission Status: Inpatient   Readmission Risk (Low < 19, Mod (19-27), High > 27): Readmission Risk Score: 9.2    Current PCP: Anil Koehler MD  PCP verified by ? Yes    Chart Reviewed: Yes      History Provided by: Patient  Patient Orientation: Alert and Oriented    Patient Cognition: Alert    Hospitalization in the last 30 days (Readmission):  No    If yes, Readmission Assessment in  Navigator will be completed.    Advance Directives:      Code Status: Full Code   Patient's Primary Decision Maker is: Legal Next of Kin      Discharge Planning:    Patient lives with: Spouse/Significant Other Type of Home: House  Primary Care Giver: Self  Patient Support Systems include: Spouse/Significant Other   Current Financial resources:  (Commercial)  Current community resources: None  Current services prior to admission: Durable Medical Equipment            Current DME: Glucometer            Type of Home Care services:  None    ADLS  Prior functional level: Independent in ADLs/IADLs  Current functional level: Independent in ADLs/IADLs    PT AM-PAC:   /24  OT AM-PAC:   /24    Family can provide assistance at DC: Yes  Would you like Case Management to discuss the discharge plan with any other family members/significant others, and if so, who? No  Plans to Return to Present Housing: Yes  Other Identified Issues/Barriers to RETURNING to current housing: none  Potential Assistance needed at discharge: Home Care,  Outpatient IV            Potential DME:    Patient expects to discharge to: House  Plan for transportation at discharge: Family    Financial    Payor: CIG[employer] / Plan: C[employer] / Product Type: *No Product type* /     Does insurance require precert for SNF: Yes    Potential assistance Purchasing Medications: No  Meds-to-Beds request:        [id number] [employer] - [place], [place] - [address] - P [phone] - F [phone]  [address]  [facility] [address]  Phone: [phone] Fax: [phone]      Notes:    Factors facilitating achievement of predicted outcomes: Family support, Cooperative, Pleasant, and Has needed Durable Medical Equipment at home    Barriers to discharge: Medical complications and Wound Care    Additional Case Management Notes: Plan is home with spouse.  Independent.  Will follow for possible IV antibiotic and wound care needs.      The Plan for Transition of Care is related to the following treatment goals of Cellulitis of foot [L03.119]  Cellulitis of left foot [L03.116]  Osteomyelitis of second toe of left foot (HCC) [M86.9]    IF APPLICABLE: The Patient and/or patient representative Andi and his family were provided with a choice of provider and agrees with the discharge plan. Freedom of choice list with basic dialogue that supports the patient's individualized plan of care/goals and shares the quality data associated with the providers was provided to:     Patient Representative Name:       The Patient and/or Patient Representative Agree with the Discharge Plan? yes     Jeanette Sharif RN  Case Management Department  Ph:  Fax: \

## 2024-06-17 NOTE — CONSULTS
[facility]  Initial Consult Note  Date: 6/17/2024    Hospital day :3     Impression:   Right-sided diabetic foot ulcer on the second toe with associated osteomyelitis  Diabetes mellitus type 2 with associated neuropathy    Recommendations   The patient continues on Zosyn and vancomycin  The plan is for the patient to be taken to the operating room today for partial versus complete second toe amputation  We will follow the operative findings and culture data and adjust therapy accordingly    Chief complaint/reason for consultation:   Diabetic foot ulcer with associated osteomyelitis    History of Present Illness:   Andi Nava is a 53 y.o.-year-old male who was initially admitted on 6/14/2024.   Andi has diabetes mellitus type 2 with associated neuropathy and he reports about 2 weeks ago he sustained an injury to his right foot second toe on the shower door and started to notice increasing swelling redness and pain in the right lower extremity with erythema in the second toe.  The patient became increasingly concerned for infection and ended up coming in to the hospital for further evaluation.    He did not report any subjective fevers, chills or sweats.  No nausea vomiting or diarrhea.    The patient came into the emergency room had an x-ray done that showed periosteal reaction about the first proximal phalanx and the patient was seen by the podiatry service started on antimicrobial therapy with Zosyn and vancomycin and a sharp excisional debridement was performed on the right second toe digit down to including subcutaneous tissues.      An MRI of the foot was done that showed a shallow soft tissue defect in the distal aspect of the second toe with underlying subcutaneous edema extending to the dorsum of the foot compatible with cellulitis.  There is bone marrow edema and confluent increased T1 signal throughout the second distal phalanx suspicious for osteomyelitis.  There is also bone

## 2024-06-17 NOTE — PROGRESS NOTES
[facility]  Office: [phone]  Saeid Horan DO, Kuldeep Ascencio DO, Hal Su DO, Ant Rebolledo DO, Martinez Gallardo MD, Dinora Starks MD, Misael Granados MD, Julia Rolle MD,  Sal Pisano MD, Honorio Soto MD, Darren Briones MD,  Justin Streeter DO, Bárbara Davis MD, Ray Iraheta MD, Eugene Horan DO, Tere Nicole MD,  Rashard Dee DO, Natasha Melara MD, Ching Valles MD, Radha Marte MD, Grady Guzman MD,  Piyush Ortiz MD, Taiwo Coyne MD, Yazmin Michelle MD, Joe Rodriguez MD, Ammon Colbert MD, Abbey lUloa MD, Pete Bear DO, Bunny Bryan DO, Meme Vigil MD,  Sherif Gutierrez MD, Shirley Waterhouse, CNP,  Angelica Angela CNP, Mohit Olson, CNP,  Chelsi Fischer, DNP, Julita Causey, CNP, Velia Navarro, CNP, Agnes Pack CNP, Kelly العراقي, CNP, Adina Spann, PA-C, Gladys Woodall PA-C, Haley Ballard, CNP, Donna Lowry, CNP, Mirian Rosales, CNP, Ana Rocha, CNP, Cecilia Kurtz, CNP, Paula Gtz, CNS, Oanh Florence, CNP, Katelynn Giordano CNP, Tracy Schwab, CNP         [facility]   IN-PATIENT SERVICE   [facility]    Progress Note    6/17/2024    7:56 AM    Name:   Andi Nava  MRN:     [id number]     Acct:      [id number]   Room:   318/318-01   Day:  3  Admit Date:  6/14/2024 11:31 AM    PCP:   Anil Koehler MD  Code Status:  Full Code    Subjective:     C/C:   Chief Complaint   Patient presents with    Foot Injury     6 weeks ago pt lost a toe nail on the 2nd digit  X1 week ago cut that toe in shower- swelling/black around nail  Went to urgent care 30 min pta- sent here just in case d/t having diabetes & possible need for IV abx      Interval History Status: not changed.   Patient seen and examined bedside this morning.   Labs reviewed.  CRP trending down.  Blood glucose well-controlled.  Antibiotic management per ID  Plan for OR for amputation at discretion of podiatry    Brief History:     53 y.o.  Palpations: Abdomen is soft.      Tenderness: There is no abdominal tenderness. There is no guarding or rebound.   Musculoskeletal:         General: Swelling, tenderness and deformity present.      Cervical back: Normal range of motion and neck supple.      Comments: Right foot cellulitis.   Skin:     Findings: No erythema or rash.      Comments: Right foot wound   Neurological:      Mental Status: He is alert and oriented to person, place, and time.      Cranial Nerves: No cranial nerve deficit.   Psychiatric:         Behavior: Behavior normal.         Assessment:        Hospital Problems             Last Modified POA    * (Principal) Cellulitis of foot 6/14/2024 Yes    Diabetic peripheral neuropathy (HCC) 6/14/2024 Yes    Type 1 diabetes mellitus with hyperglycemia (HCC) 6/14/2024 Yes     Plan:        Diabetic ulceration right second foot-  Right foot cellulitis/osteomyelitis S/p debridement by podiatry-evidence of osteomyelitis on MRI.  Currently on IV Vanco and Zosyn.  Managed by ID..  Plan for OR for amputation of second digit at discretion of podiatry.      DM with hyperglycemia: HbA1c 8.1.  Well-controlled on current current regimen.  Continue glipizide, Actos and sliding scale insulin.  Hold metformin for now and resume at discharge.   Hypoglycemia protocol.  POC glucose checks.  Diabetic peripheral neuropathy: Home dose gabapentin   History of alcohol abuse-currently sober.  Hypertension-on lisinopril   DVT prophylaxis with Lovenox.  Rest of the management as per primary.  Medicine will sign off at this time.  Please contact for any further questions    Grady Guzman MD  6/17/2024  7:56 AM

## 2024-06-17 NOTE — PROGRESS NOTES
Writer received call from surgery scheduler, stating patient's procedure has been cancelled and rescheduled for tomorrow at 0700. Notified covering physician (KEILA Pringle) of the change and requested diet order. Patient and spouse updated on this change.

## 2024-06-17 NOTE — PROGRESS NOTES
SPIRITUAL CARE DEPARTMENT [facility]  PROGRESS NOTE    Room # 318/318-01   Name: Andi Nava            Anabaptist: none     Reason for visit: Routine    I visited the patient.    Admit Date & Time: 6/14/2024 11:31 AM    Assessment:  Andi Nava is a 53 y.o. male in the hospital because Cellulitis of foot. Upon entering the room Pt and spouse were very welcoming and open to conversation. Good visit.      Intervention:  I introduced myself and my title as  I offered space for Pt  to express feelings, needs, and concerns and provided a ministry presence.  provided active listening and support and care to Pt and spouse.    Outcome:  Pt and spouse opened up about concerns and feelings about healthcare and Pt illness. Pt thanked  for visit.    Plan:  Chaplains will remain available to offer spiritual and emotional support as needed.    Electronically signed by Chaplain JASON, on 6/16/2024 at 8:09 PM.  Spiritual Care Department  [facility]      06/16/24 2007   Encounter Summary   Encounter Overview/Reason Initial Encounter   Service Provided For Patient;Significant other   Support System Spouse   Last Encounter  06/16/24   Complexity of Encounter High   Begin Time 1945   End Time  2000   Total Time Calculated 15 min   Crisis   Type Trauma   Spiritual/Emotional needs   Type Spiritual Support;Emotional Distress   Grief, Loss, and Adjustments   Type Life Adjustments;Adjustment to illness   Assessment/Intervention/Outcome   Assessment Calm;Concerns with suffering;Coping   Intervention Active listening;Discussed meaning/purpose   Outcome Coping;Encouraged   Plan and Referrals   Plan/Referrals Continue to visit, (comment)

## 2024-06-17 NOTE — PROGRESS NOTES
Progress Note  Foot and Ankle Surgery    Subjective     CC: Right foot 2nd toe wound, osteomyelitis     Interval:   - Patient seen and examined at bedside during rounding.  - No acute events overnight.  Vital signs stable at this time.  - Patient denies any pain to his foot.    - Denies any constitutional symptoms.  - Informed patient surgery is rescheduled for tomorrow 6/18/24 at 7:30am.    Updated Labs:     WBC: 7.2->9.2->6.3->6.0->  Lab Results   Component Value Date    WBC 5.6 06/17/2024     ESR: 34->23->9->  Lab Results   Component Value Date    SEDRATE 13 06/17/2024     CRP: 16.3->27.8->16.8->  Lab Results   Component Value Date    CRP 13.5 (H) 06/17/2024         HPI :  Andi Nava is a 53 y.o. male seen at [facility] for a wound to the second digit with associated cellulitis.  Patient states that he stubbed his toe in the shower roughly 7 to 10 days ago and since that time is noticed an increase in swelling, redness and pain to the area.  He explains that he became increasingly concerned for infection to the area so presented to the hospital for further evaluation.  Denies any recent antibiotic use.  Denies outpatient follow-up with a podiatrist.  Denies any systemic signs of infection including nausea, vomiting, fever, chills or diarrhea.  No other pedal complaints at this time.     PCP is Anil Koehler MD    ROS: Denies N/V/F/C/SOB/CP.  Otherwise negative except at stated in the HPI.     Medications:  Scheduled Meds:   sodium chloride flush  5-40 mL IntraVENous 2 times per day    enoxaparin  40 mg SubCUTAneous Daily    piperacillin-tazobactam  3,375 mg IntraVENous Q8H    vancomycin  1,250 mg IntraVENous Q12H    vancomycin (VANCOCIN) intermittent dosing (placeholder)   Other RX Placeholder    aspirin  81 mg Oral Daily    atorvastatin  80 mg Oral Nightly    gabapentin  200 mg Oral TID    glipiZIDE  20 mg Oral BID AC    lisinopril  10 mg Oral Daily    [Held by provider] metFORMIN  1,000 mg Oral  BID WC    pioglitazone  30 mg Oral Nightly    empagliflozin  25 mg Oral Daily    insulin lispro  0-4 Units SubCUTAneous TID WC    insulin lispro  0-4 Units SubCUTAneous Nightly    sodium chloride flush  10 mL IntraVENous BID       Continuous Infusions:   sodium chloride      dextrose         PRN Meds:sodium chloride flush, sodium chloride, potassium chloride **OR** potassium alternative oral replacement **OR** potassium chloride, magnesium sulfate, ondansetron **OR** ondansetron, polyethylene glycol, acetaminophen **OR** acetaminophen, glucose, dextrose bolus **OR** dextrose bolus, glucagon (rDNA), dextrose    Objective     Vitals:  Patient Vitals for the past 8 hrs:   BP Temp Temp src Pulse SpO2   24 0908 (!) 137/94 98.4 °F (36.9 °C) Oral 79 94 %       Average, Min, and Max for last 24 hours Vitals:  TEMPERATURE:  Temp  Av.4 °F (36.9 °C)  Min: 98.4 °F (36.9 °C)  Max: 98.4 °F (36.9 °C)    RESPIRATIONS RANGE: Resp  Av  Min: 17  Max: 17    PULSE RANGE: Pulse  Av  Min: 79  Max: 85    BLOOD PRESSURE RANGE:  Systolic (24hrs), Av , Min:137 , Max:143   ; Diastolic (24hrs), Av, Min:86, Max:94      PULSE OXIMETRY RANGE: SpO2  Av.5 %  Min: 94 %  Max: 95 %    I/O last 3 completed shifts:  In: 303.5 [IV Piggyback:303.5]  Out: -     CBC:  Recent Labs     06/15/24  0651 24  0818 24  0703   WBC 6.3 6.0 5.6   HGB 13.3* 14.5 14.3   HCT 38.3* 42.0 41.2    247 241   CRP 27.8* 16.8* 13.5*        BMP:  Recent Labs     06/15/24  0651 24  0818 24  0703    141 143   K 4.3 4.2 4.6    104 109*   CO2 24 25 23   BUN 21* 20 23*   CREATININE 1.0 0.9 1.0   GLUCOSE 156* 97 136*   CALCIUM 9.1 9.8 9.7        Coags:  No results for input(s): \"APTT\", \"PROT\", \"INR\" in the last 72 hours.    Lab Results   Component Value Date    SEDRATE 13 2024     Recent Labs     06/15/24  0651 24  0818 24  0703   CRP 27.8* 16.8* 13.5*     Physical Exam:     General: A&Ox3,

## 2024-06-18 ENCOUNTER — APPOINTMENT (OUTPATIENT)
Dept: GENERAL RADIOLOGY | Age: 54
End: 2024-06-18
Payer: COMMERCIAL

## 2024-06-18 ENCOUNTER — ANESTHESIA (OUTPATIENT)
Dept: OPERATING ROOM | Age: 54
End: 2024-06-18
Payer: COMMERCIAL

## 2024-06-18 PROBLEM — M86.9 OSTEOMYELITIS OF SECOND TOE OF LEFT FOOT (HCC): Status: ACTIVE | Noted: 2024-06-18

## 2024-06-18 PROBLEM — E11.69 DIABETIC FOOT ULCER WITH OSTEOMYELITIS (HCC): Status: ACTIVE | Noted: 2024-06-18

## 2024-06-18 PROBLEM — L97.509 DIABETIC FOOT ULCER WITH OSTEOMYELITIS (HCC): Status: ACTIVE | Noted: 2024-06-18

## 2024-06-18 PROBLEM — M86.9 OSTEOMYELITIS OF RIGHT FOOT (HCC): Status: ACTIVE | Noted: 2024-06-18

## 2024-06-18 PROBLEM — M86.171 ACUTE OSTEOMYELITIS OF TOE, RIGHT (HCC): Status: ACTIVE | Noted: 2024-06-18

## 2024-06-18 PROBLEM — L08.9 TYPE 2 DIABETES MELLITUS WITH DIABETIC FOOT INFECTION (HCC): Status: ACTIVE | Noted: 2024-06-18

## 2024-06-18 PROBLEM — M86.9 DIABETIC FOOT ULCER WITH OSTEOMYELITIS (HCC): Status: ACTIVE | Noted: 2024-06-18

## 2024-06-18 PROBLEM — E11.621 DIABETIC FOOT ULCER WITH OSTEOMYELITIS (HCC): Status: ACTIVE | Noted: 2024-06-18

## 2024-06-18 PROBLEM — E11.628 TYPE 2 DIABETES MELLITUS WITH DIABETIC FOOT INFECTION (HCC): Status: ACTIVE | Noted: 2024-06-18

## 2024-06-18 PROBLEM — L03.116 CELLULITIS OF LEFT FOOT: Status: ACTIVE | Noted: 2024-06-18

## 2024-06-18 LAB
ALBUMIN SERPL-MCNC: 3.9 G/DL (ref 3.5–5.2)
ALBUMIN/GLOB SERPL: 1.4 {RATIO} (ref 1–2.5)
ALP SERPL-CCNC: 63 U/L (ref 40–129)
ALT SERPL-CCNC: 18 U/L (ref 5–41)
ANION GAP SERPL CALCULATED.3IONS-SCNC: 10 MMOL/L (ref 9–17)
AST SERPL-CCNC: 15 U/L
BASOPHILS # BLD: 0.1 K/UL (ref 0–0.2)
BASOPHILS NFR BLD: 1 % (ref 0–2)
BILIRUB SERPL-MCNC: 0.2 MG/DL (ref 0.3–1.2)
BUN SERPL-MCNC: 22 MG/DL (ref 6–20)
CALCIUM SERPL-MCNC: 9.6 MG/DL (ref 8.6–10.4)
CHLORIDE SERPL-SCNC: 109 MMOL/L (ref 98–107)
CO2 SERPL-SCNC: 23 MMOL/L (ref 20–31)
CREAT SERPL-MCNC: 1 MG/DL (ref 0.7–1.2)
CRP SERPL HS-MCNC: 10.7 MG/L (ref 0–5)
EOSINOPHIL # BLD: 0.2 K/UL (ref 0–0.4)
EOSINOPHILS RELATIVE PERCENT: 4 % (ref 1–4)
ERYTHROCYTE [DISTWIDTH] IN BLOOD BY AUTOMATED COUNT: 12.4 % (ref 12.5–15.4)
ERYTHROCYTE [SEDIMENTATION RATE] IN BLOOD BY PHOTOMETRIC METHOD: 19 MM/HR (ref 0–20)
GFR, ESTIMATED: >90 ML/MIN/1.73M2
GLUCOSE BLD-MCNC: 116 MG/DL (ref 75–110)
GLUCOSE BLD-MCNC: 137 MG/DL (ref 75–110)
GLUCOSE BLD-MCNC: 159 MG/DL (ref 75–110)
GLUCOSE BLD-MCNC: 186 MG/DL (ref 75–110)
GLUCOSE BLD-MCNC: 264 MG/DL (ref 75–110)
GLUCOSE BLD-MCNC: 293 MG/DL (ref 75–110)
GLUCOSE SERPL-MCNC: 131 MG/DL (ref 70–99)
HCT VFR BLD AUTO: 39.3 % (ref 41–53)
HGB BLD-MCNC: 13.3 G/DL (ref 13.5–17.5)
LYMPHOCYTES NFR BLD: 1.2 K/UL (ref 1–4.8)
LYMPHOCYTES RELATIVE PERCENT: 20 % (ref 24–44)
MCH RBC QN AUTO: 34.5 PG (ref 26–34)
MCHC RBC AUTO-ENTMCNC: 33.9 G/DL (ref 31–37)
MCV RBC AUTO: 101.8 FL (ref 80–100)
MONOCYTES NFR BLD: 0.7 K/UL (ref 0.1–1.2)
MONOCYTES NFR BLD: 12 % (ref 2–11)
NEUTROPHILS NFR BLD: 63 % (ref 36–66)
NEUTS SEG NFR BLD: 3.9 K/UL (ref 1.8–7.7)
PLATELET # BLD AUTO: 239 K/UL (ref 140–450)
PMV BLD AUTO: 7.8 FL (ref 6–12)
POTASSIUM SERPL-SCNC: 4.4 MMOL/L (ref 3.7–5.3)
PROT SERPL-MCNC: 6.6 G/DL (ref 6.4–8.3)
RBC # BLD AUTO: 3.86 M/UL (ref 4.5–5.9)
SODIUM SERPL-SCNC: 142 MMOL/L (ref 135–144)
WBC OTHER # BLD: 6.1 K/UL (ref 3.5–11)

## 2024-06-18 PROCEDURE — 2580000003 HC RX 258

## 2024-06-18 PROCEDURE — 6370000000 HC RX 637 (ALT 250 FOR IP)

## 2024-06-18 PROCEDURE — 28825 PARTIAL AMPUTATION OF TOE: CPT | Performed by: PODIATRIST

## 2024-06-18 PROCEDURE — 7100000000 HC PACU RECOVERY - FIRST 15 MIN: Performed by: PODIATRIST

## 2024-06-18 PROCEDURE — 86140 C-REACTIVE PROTEIN: CPT

## 2024-06-18 PROCEDURE — 73630 X-RAY EXAM OF FOOT: CPT

## 2024-06-18 PROCEDURE — 6360000002 HC RX W HCPCS

## 2024-06-18 PROCEDURE — 82947 ASSAY GLUCOSE BLOOD QUANT: CPT

## 2024-06-18 PROCEDURE — 1200000000 HC SEMI PRIVATE

## 2024-06-18 PROCEDURE — 7100000001 HC PACU RECOVERY - ADDTL 15 MIN: Performed by: PODIATRIST

## 2024-06-18 PROCEDURE — 6360000002 HC RX W HCPCS: Performed by: NURSE ANESTHETIST, CERTIFIED REGISTERED

## 2024-06-18 PROCEDURE — 28005 TREAT FOOT BONE LESION: CPT | Performed by: PODIATRIST

## 2024-06-18 PROCEDURE — 0JBQ0ZZ EXCISION OF RIGHT FOOT SUBCUTANEOUS TISSUE AND FASCIA, OPEN APPROACH: ICD-10-PCS

## 2024-06-18 PROCEDURE — 2709999900 HC NON-CHARGEABLE SUPPLY: Performed by: PODIATRIST

## 2024-06-18 PROCEDURE — 6360000002 HC RX W HCPCS: Performed by: PODIATRIST

## 2024-06-18 PROCEDURE — 3700000000 HC ANESTHESIA ATTENDED CARE: Performed by: PODIATRIST

## 2024-06-18 PROCEDURE — 88305 TISSUE EXAM BY PATHOLOGIST: CPT

## 2024-06-18 PROCEDURE — 0Y6R0Z3 DETACHMENT AT RIGHT 2ND TOE, LOW, OPEN APPROACH: ICD-10-PCS

## 2024-06-18 PROCEDURE — 2500000003 HC RX 250 WO HCPCS: Performed by: NURSE ANESTHETIST, CERTIFIED REGISTERED

## 2024-06-18 PROCEDURE — 3600000012 HC SURGERY LEVEL 2 ADDTL 15MIN: Performed by: PODIATRIST

## 2024-06-18 PROCEDURE — 2580000003 HC RX 258: Performed by: STUDENT IN AN ORGANIZED HEALTH CARE EDUCATION/TRAINING PROGRAM

## 2024-06-18 PROCEDURE — 99232 SBSQ HOSP IP/OBS MODERATE 35: CPT | Performed by: INTERNAL MEDICINE

## 2024-06-18 PROCEDURE — 2500000003 HC RX 250 WO HCPCS: Performed by: PODIATRIST

## 2024-06-18 PROCEDURE — 85652 RBC SED RATE AUTOMATED: CPT

## 2024-06-18 PROCEDURE — 87070 CULTURE OTHR SPECIMN AEROBIC: CPT

## 2024-06-18 PROCEDURE — 3600000002 HC SURGERY LEVEL 2 BASE: Performed by: PODIATRIST

## 2024-06-18 PROCEDURE — 3700000001 HC ADD 15 MINUTES (ANESTHESIA): Performed by: PODIATRIST

## 2024-06-18 PROCEDURE — 85025 COMPLETE CBC W/AUTO DIFF WBC: CPT

## 2024-06-18 PROCEDURE — 87205 SMEAR GRAM STAIN: CPT

## 2024-06-18 PROCEDURE — 80053 COMPREHEN METABOLIC PANEL: CPT

## 2024-06-18 PROCEDURE — 87075 CULTR BACTERIA EXCEPT BLOOD: CPT

## 2024-06-18 PROCEDURE — 36415 COLL VENOUS BLD VENIPUNCTURE: CPT

## 2024-06-18 RX ORDER — SODIUM CHLORIDE, SODIUM LACTATE, POTASSIUM CHLORIDE, CALCIUM CHLORIDE 600; 310; 30; 20 MG/100ML; MG/100ML; MG/100ML; MG/100ML
INJECTION, SOLUTION INTRAVENOUS CONTINUOUS
Status: DISCONTINUED | OUTPATIENT
Start: 2024-06-18 | End: 2024-06-18 | Stop reason: HOSPADM

## 2024-06-18 RX ORDER — SODIUM CHLORIDE 0.9 % (FLUSH) 0.9 %
5-40 SYRINGE (ML) INJECTION EVERY 12 HOURS SCHEDULED
Status: DISCONTINUED | OUTPATIENT
Start: 2024-06-18 | End: 2024-06-18 | Stop reason: HOSPADM

## 2024-06-18 RX ORDER — OXYCODONE HYDROCHLORIDE 5 MG/1
10 TABLET ORAL PRN
Status: DISCONTINUED | OUTPATIENT
Start: 2024-06-18 | End: 2024-06-18 | Stop reason: HOSPADM

## 2024-06-18 RX ORDER — LIDOCAINE HYDROCHLORIDE 10 MG/ML
1 INJECTION, SOLUTION EPIDURAL; INFILTRATION; INTRACAUDAL; PERINEURAL
Status: DISCONTINUED | OUTPATIENT
Start: 2024-06-18 | End: 2024-06-18 | Stop reason: HOSPADM

## 2024-06-18 RX ORDER — KETOROLAC TROMETHAMINE 30 MG/ML
INJECTION, SOLUTION INTRAMUSCULAR; INTRAVENOUS PRN
Status: DISCONTINUED | OUTPATIENT
Start: 2024-06-18 | End: 2024-06-18 | Stop reason: SDUPTHER

## 2024-06-18 RX ORDER — DIPHENHYDRAMINE HYDROCHLORIDE 50 MG/ML
12.5 INJECTION INTRAMUSCULAR; INTRAVENOUS
Status: DISCONTINUED | OUTPATIENT
Start: 2024-06-18 | End: 2024-06-18 | Stop reason: HOSPADM

## 2024-06-18 RX ORDER — SODIUM CHLORIDE 9 MG/ML
INJECTION, SOLUTION INTRAVENOUS PRN
Status: DISCONTINUED | OUTPATIENT
Start: 2024-06-18 | End: 2024-06-18 | Stop reason: HOSPADM

## 2024-06-18 RX ORDER — METOCLOPRAMIDE HYDROCHLORIDE 5 MG/ML
10 INJECTION INTRAMUSCULAR; INTRAVENOUS
Status: DISCONTINUED | OUTPATIENT
Start: 2024-06-18 | End: 2024-06-18 | Stop reason: HOSPADM

## 2024-06-18 RX ORDER — MIDAZOLAM HYDROCHLORIDE 2 MG/2ML
2 INJECTION, SOLUTION INTRAMUSCULAR; INTRAVENOUS
Status: DISCONTINUED | OUTPATIENT
Start: 2024-06-18 | End: 2024-06-18 | Stop reason: HOSPADM

## 2024-06-18 RX ORDER — MORPHINE SULFATE 2 MG/ML
1 INJECTION, SOLUTION INTRAMUSCULAR; INTRAVENOUS EVERY 5 MIN PRN
Status: DISCONTINUED | OUTPATIENT
Start: 2024-06-18 | End: 2024-06-18 | Stop reason: HOSPADM

## 2024-06-18 RX ORDER — SODIUM CHLORIDE 0.9 % (FLUSH) 0.9 %
5-40 SYRINGE (ML) INJECTION PRN
Status: DISCONTINUED | OUTPATIENT
Start: 2024-06-18 | End: 2024-06-18 | Stop reason: HOSPADM

## 2024-06-18 RX ORDER — NALOXONE HYDROCHLORIDE 0.4 MG/ML
INJECTION, SOLUTION INTRAMUSCULAR; INTRAVENOUS; SUBCUTANEOUS PRN
Status: DISCONTINUED | OUTPATIENT
Start: 2024-06-18 | End: 2024-06-18 | Stop reason: HOSPADM

## 2024-06-18 RX ORDER — PROPOFOL 10 MG/ML
INJECTION, EMULSION INTRAVENOUS PRN
Status: DISCONTINUED | OUTPATIENT
Start: 2024-06-18 | End: 2024-06-18 | Stop reason: SDUPTHER

## 2024-06-18 RX ORDER — LIDOCAINE HYDROCHLORIDE 10 MG/ML
INJECTION, SOLUTION INFILTRATION; PERINEURAL
Status: DISPENSED
Start: 2024-06-18 | End: 2024-06-18

## 2024-06-18 RX ORDER — OXYCODONE HYDROCHLORIDE AND ACETAMINOPHEN 5; 325 MG/1; MG/1
2 TABLET ORAL EVERY 4 HOURS PRN
Status: DISCONTINUED | OUTPATIENT
Start: 2024-06-18 | End: 2024-06-19 | Stop reason: HOSPADM

## 2024-06-18 RX ORDER — BUPIVACAINE HYDROCHLORIDE 5 MG/ML
INJECTION, SOLUTION EPIDURAL; INTRACAUDAL
Status: DISPENSED
Start: 2024-06-18 | End: 2024-06-18

## 2024-06-18 RX ORDER — PROPOFOL 10 MG/ML
INJECTION, EMULSION INTRAVENOUS CONTINUOUS PRN
Status: DISCONTINUED | OUTPATIENT
Start: 2024-06-18 | End: 2024-06-18 | Stop reason: SDUPTHER

## 2024-06-18 RX ORDER — MIDAZOLAM HYDROCHLORIDE 1 MG/ML
INJECTION INTRAMUSCULAR; INTRAVENOUS PRN
Status: DISCONTINUED | OUTPATIENT
Start: 2024-06-18 | End: 2024-06-18 | Stop reason: SDUPTHER

## 2024-06-18 RX ORDER — LIDOCAINE HYDROCHLORIDE 10 MG/ML
INJECTION, SOLUTION INFILTRATION; PERINEURAL PRN
Status: DISCONTINUED | OUTPATIENT
Start: 2024-06-18 | End: 2024-06-18 | Stop reason: SDUPTHER

## 2024-06-18 RX ORDER — OXYCODONE HYDROCHLORIDE AND ACETAMINOPHEN 5; 325 MG/1; MG/1
1 TABLET ORAL EVERY 4 HOURS PRN
Status: DISCONTINUED | OUTPATIENT
Start: 2024-06-18 | End: 2024-06-19 | Stop reason: HOSPADM

## 2024-06-18 RX ORDER — LABETALOL HYDROCHLORIDE 5 MG/ML
10 INJECTION, SOLUTION INTRAVENOUS
Status: DISCONTINUED | OUTPATIENT
Start: 2024-06-18 | End: 2024-06-18 | Stop reason: HOSPADM

## 2024-06-18 RX ORDER — FENTANYL CITRATE 50 UG/ML
INJECTION, SOLUTION INTRAMUSCULAR; INTRAVENOUS PRN
Status: DISCONTINUED | OUTPATIENT
Start: 2024-06-18 | End: 2024-06-18 | Stop reason: SDUPTHER

## 2024-06-18 RX ORDER — MEPERIDINE HYDROCHLORIDE 50 MG/ML
12.5 INJECTION INTRAMUSCULAR; INTRAVENOUS; SUBCUTANEOUS ONCE
Status: DISCONTINUED | OUTPATIENT
Start: 2024-06-18 | End: 2024-06-18 | Stop reason: HOSPADM

## 2024-06-18 RX ORDER — ONDANSETRON 2 MG/ML
4 INJECTION INTRAMUSCULAR; INTRAVENOUS
Status: DISCONTINUED | OUTPATIENT
Start: 2024-06-18 | End: 2024-06-18 | Stop reason: HOSPADM

## 2024-06-18 RX ORDER — OXYCODONE HYDROCHLORIDE 5 MG/1
5 TABLET ORAL PRN
Status: DISCONTINUED | OUTPATIENT
Start: 2024-06-18 | End: 2024-06-18 | Stop reason: HOSPADM

## 2024-06-18 RX ORDER — HYDRALAZINE HYDROCHLORIDE 20 MG/ML
10 INJECTION INTRAMUSCULAR; INTRAVENOUS
Status: DISCONTINUED | OUTPATIENT
Start: 2024-06-18 | End: 2024-06-18 | Stop reason: HOSPADM

## 2024-06-18 RX ADMIN — SODIUM CHLORIDE 1250 MG: 9 INJECTION, SOLUTION INTRAVENOUS at 07:07

## 2024-06-18 RX ADMIN — INSULIN LISPRO 2 UNITS: 100 INJECTION, SOLUTION INTRAVENOUS; SUBCUTANEOUS at 13:29

## 2024-06-18 RX ADMIN — PIPERACILLIN AND TAZOBACTAM 3375 MG: 3; .375 INJECTION, POWDER, LYOPHILIZED, FOR SOLUTION INTRAVENOUS at 01:54

## 2024-06-18 RX ADMIN — GABAPENTIN 200 MG: 100 CAPSULE ORAL at 09:59

## 2024-06-18 RX ADMIN — PIOGLITAZONE 30 MG: 15 TABLET ORAL at 21:23

## 2024-06-18 RX ADMIN — ATORVASTATIN CALCIUM 80 MG: 40 TABLET, FILM COATED ORAL at 21:23

## 2024-06-18 RX ADMIN — ASPIRIN 81 MG: 81 TABLET, COATED ORAL at 09:59

## 2024-06-18 RX ADMIN — GABAPENTIN 200 MG: 100 CAPSULE ORAL at 21:23

## 2024-06-18 RX ADMIN — FENTANYL CITRATE 25 MCG: 50 INJECTION, SOLUTION INTRAMUSCULAR; INTRAVENOUS at 07:23

## 2024-06-18 RX ADMIN — SODIUM CHLORIDE, PRESERVATIVE FREE 10 ML: 5 INJECTION INTRAVENOUS at 10:00

## 2024-06-18 RX ADMIN — FENTANYL CITRATE 25 MCG: 50 INJECTION, SOLUTION INTRAMUSCULAR; INTRAVENOUS at 07:25

## 2024-06-18 RX ADMIN — LIDOCAINE HYDROCHLORIDE 40 MG: 10 INJECTION, SOLUTION INFILTRATION; PERINEURAL at 07:24

## 2024-06-18 RX ADMIN — SODIUM CHLORIDE: 9 INJECTION, SOLUTION INTRAVENOUS at 06:48

## 2024-06-18 RX ADMIN — EMPAGLIFLOZIN 25 MG: 10 TABLET, FILM COATED ORAL at 10:00

## 2024-06-18 RX ADMIN — SODIUM CHLORIDE: 9 INJECTION, SOLUTION INTRAVENOUS at 07:05

## 2024-06-18 RX ADMIN — PIPERACILLIN AND TAZOBACTAM 3375 MG: 3; .375 INJECTION, POWDER, LYOPHILIZED, FOR SOLUTION INTRAVENOUS at 10:07

## 2024-06-18 RX ADMIN — FENTANYL CITRATE 25 MCG: 50 INJECTION, SOLUTION INTRAMUSCULAR; INTRAVENOUS at 08:01

## 2024-06-18 RX ADMIN — GLIPIZIDE 20 MG: 5 TABLET ORAL at 16:31

## 2024-06-18 RX ADMIN — PIPERACILLIN AND TAZOBACTAM 3375 MG: 3; .375 INJECTION, POWDER, LYOPHILIZED, FOR SOLUTION INTRAVENOUS at 17:55

## 2024-06-18 RX ADMIN — FENTANYL CITRATE 25 MCG: 50 INJECTION, SOLUTION INTRAMUSCULAR; INTRAVENOUS at 07:30

## 2024-06-18 RX ADMIN — GABAPENTIN 200 MG: 100 CAPSULE ORAL at 14:10

## 2024-06-18 RX ADMIN — PROPOFOL 40 MG: 10 INJECTION, EMULSION INTRAVENOUS at 07:25

## 2024-06-18 RX ADMIN — LISINOPRIL 10 MG: 10 TABLET ORAL at 09:59

## 2024-06-18 RX ADMIN — PROPOFOL 100 MCG/KG/MIN: 10 INJECTION, EMULSION INTRAVENOUS at 07:25

## 2024-06-18 RX ADMIN — KETOROLAC TROMETHAMINE 30 MG: 30 INJECTION, SOLUTION INTRAMUSCULAR; INTRAVENOUS at 08:04

## 2024-06-18 RX ADMIN — MIDAZOLAM 2 MG: 1 INJECTION INTRAMUSCULAR; INTRAVENOUS at 07:20

## 2024-06-18 RX ADMIN — SODIUM CHLORIDE 1250 MG: 9 INJECTION, SOLUTION INTRAVENOUS at 18:52

## 2024-06-18 ASSESSMENT — PAIN - FUNCTIONAL ASSESSMENT
PAIN_FUNCTIONAL_ASSESSMENT: NONE - DENIES PAIN
PAIN_FUNCTIONAL_ASSESSMENT: 0-10

## 2024-06-18 ASSESSMENT — ENCOUNTER SYMPTOMS
RESPIRATORY NEGATIVE: 1
GASTROINTESTINAL NEGATIVE: 1

## 2024-06-18 NOTE — PROGRESS NOTES
[facility]  Progress Note    Andi Nava  MRN: [id number]  Date: 6/18/2024  LOS: 4     Reason for F/U :   Right-sided second toe diabetic foot ulcer with associated osteomyelitis    Impression :   Right-sided diabetic foot ulcer on the second toe with associated osteomyelitis  Diabetes mellitus type 2 with associated neuropathy    Recommendations:   The patient is status post right foot second toe partial digit amputation  There are no major findings to suggest residual or deep infection.  The patient continues on antimicrobial therapy with Zosyn and vancomycin.  The plan will likely be for oral antibiotics at the time of discharge    Infection Control Recommendations:   Universal precautions    Discharge Planning:   Estimated Length of IV antimicrobials: To be determined  Patient will need Midline Catheter Insertion/ PICC line Insertion: No  Patient will need: Home IV , Infusion Center,  SNF,  LTAC: Undetermined  Patient willneed outpatient wound care: No    Medical Decision making / Summary of Stay:   Andi Nava is a 53 y.o.-year-old male who was initially admitted on 6/14/2024.   Andi has diabetes mellitus type 2 with associated neuropathy and he reports about 2 weeks ago he sustained an injury to his right foot second toe on the shower door and started to notice increasing swelling redness and pain in the right lower extremity with erythema in the second toe.  The patient became increasingly concerned for infection and ended up coming in to the hospital for further evaluation.     He did not report any subjective fevers, chills or sweats.  No nausea vomiting or diarrhea.    The patient came into the emergency room had an x-ray done that showed periosteal reaction about the first proximal phalanx and the patient was seen by the podiatry service started on antimicrobial therapy with Zosyn and vancomycin and a sharp excisional debridement was performed on the right second toe digit down to     Culture NO GROWTH 4 DAYS   Blood Culture 1 [[id number]] Collected: 06/14/24 1222   Order Status: Completed Specimen: Blood Updated: 06/18/24 1628    Specimen Description .BLOOD    Special Requests left ac 20ml    Culture NO GROWTH 4 DAYS   Culture, Anaerobic and Aerobic [[id number]] Collected: 06/18/24 0730   Order Status: Completed Specimen: Bone from Toe Updated: 06/18/24 1009    Specimen Description .TOE    Special Requests Site: Bone    Direct Exam NO ORGANISMS SEEN     NO NEUTROPHILS SEEN    Culture PENDING       Cultures:   Culture and Sensitivities:  Recent Labs     06/18/24  0730   SPECDESC .TOE   SPECIAL Site: Bone   CULTURE PENDING         Medications:      BUPivacaine (PF)        lidocaine        sodium chloride flush  5-40 mL IntraVENous 2 times per day    enoxaparin  40 mg SubCUTAneous Daily    piperacillin-tazobactam  3,375 mg IntraVENous Q8H    vancomycin  1,250 mg IntraVENous Q12H    vancomycin (VANCOCIN) intermittent dosing (placeholder)   Other RX Placeholder    aspirin  81 mg Oral Daily    atorvastatin  80 mg Oral Nightly    gabapentin  200 mg Oral TID    glipiZIDE  20 mg Oral BID AC    lisinopril  10 mg Oral Daily    [Held by provider] metFORMIN  1,000 mg Oral BID WC    pioglitazone  30 mg Oral Nightly    empagliflozin  25 mg Oral Daily    insulin lispro  0-4 Units SubCUTAneous TID WC    insulin lispro  0-4 Units SubCUTAneous Nightly    sodium chloride flush  10 mL IntraVENous BID       Electronically signed by Yonatan Wood MD on 6/18/2024 at 5:02 PM      [facility]  Yonatan Wood MD  [employer] messaging  OFFICE: [phone]    Thank you for allowing us to participate in the care of this patient. Please call with questions.    This note is created with the assistance of a speech recognition program.  While intending to generate a document that actually reflects the content of the visit, the document can still have some errors including those of

## 2024-06-18 NOTE — PLAN OF CARE
Problem: Discharge Planning  Goal: Discharge to home or other facility with appropriate resources  Outcome: Progressing     Problem: Pain  Goal: Verbalizes/displays adequate comfort level or baseline comfort level  Outcome: Progressing  Flowsheets  Taken 6/18/2024 0845 by Temitope Canas RN  Verbalizes/displays adequate comfort level or baseline comfort level: Assess pain using appropriate pain scale  Taken 6/18/2024 0830 by Temitope Canas RN  Verbalizes/displays adequate comfort level or baseline comfort level: Assess pain using appropriate pain scale  Taken 6/18/2024 0815 by Temitope Canas RN  Verbalizes/displays adequate comfort level or baseline comfort level: Assess pain using appropriate pain scale     Problem: Safety - Adult  Goal: Free from fall injury  Outcome: Progressing     Problem: Chronic Conditions and Co-morbidities  Goal: Patient's chronic conditions and co-morbidity symptoms are monitored and maintained or improved  Outcome: Progressing

## 2024-06-18 NOTE — OP NOTE
Operative Note      Patient: Andi Nava  YOB: 1970  MRN: [id number]    Date of Procedure: 6/18/2024    Pre-Op Diagnosis Codes:  Diabetic ulceration down to layer of subcutaneous tissue, right second digit  Osteomyelitis, right second digit  Cellulitis, right foot  Right foot pain  Type 2 diabetes with peripheral neuropathy       Post-Op Diagnosis: Same       Procedure(s):  Partial 2nd digit amputation at DIPJ, right foot (CPT 35022)  Incision of bone cortex for osteomyelitis, right foot (CPT 59015)    Surgeon(s):  Leticia Taylor DPM    Assistant:   Resident: Elana Herring DPM    Anesthesia: MAC    Hemostasis: Direct pressure    Injectables: 10 cc mix of 1% lidocaine plain and 0.5% marcaine plain     Estimated Blood Loss (mL): Less than 5cc.     Complications: None    Specimens:   ID Type Source Tests Collected by Time Destination   1 : RIGHT 2ND TOE Bone Toe CULTURE, ANAEROBIC AND AEROBIC Leticia Taylor DPM 6/18/2024 0745    A : RIGHT 2ND TOE Tissue Tissue SURGICAL PATHOLOGY Leticia Taylor DPM 6/18/2024 0742        Implants:  * No implants in log *      Drains: * No LDAs found *    Findings:  Infection Present At Time Of Surgery (PATOS) (choose all levels that have infection present):  Osteomyelitis  Other Findings:  Ulceration to right distal 2nd digit noted.        Detailed Description of Procedure:     INDICATION FOR PROCEDURE: The patient has had an infection of the right 2nd digit for some time. Plain film radiographs show osteolytic changes with infection extending down and into the level of bone of distal phalanx. This has failed conservative treatments thus far, and the patient elects to undergo surgical correction. Discussed with patient and family that the patient is at high risk for further amputation and/or limb loss. All risks and benefits discussed with the patient in detail. No guarantees were given or implied. Consent signed and in the chart.     PROCEDURE IN  DETAIL: The patient was transported from pre-op to the operating room and placed on the operating table in the supine position with a safety strap across the lap. After adequate sedation by the Anesthesia, a local block of 10cc of a 1:1 mixture of 1% lidocaine plain and 0.5% Marcaine plain was injected. The foot was then prepped and draped in the usual aseptic fashion. A pneumatic ankle tourniquet was then applied to right ankle and not inflated.    We proceded with partial 2nd digit amputation with incision of bone where attention was directed to the right 2nd digit. There was noted to be a wound. A #15 blade was used to create two converging semieliptical incisions around the DIPJ leaving viable full thickness plantar skin for flap closure. The 2nd digit was disarticulated at the DIPJ and passed from the table as specimen.  Next, incision of bone cortex for osteomyelitis was performed of the second digit where the distal aspect of the middle phalanx was removed via rongeur and passed from the table.  There was minimal bleeding appreciated. Next, normal saline was used to flush and irrigate the surgical site. The surgical site was then coapted sequentially in layers.    Dressings consisted of adaptic, sterile 4 x 4s, ABD, Kerlix and an Ace bandage were applied. The patient tolerated the above procedure and anesthesia well without complications. The patient was transported from the operating room to the PACU with vital signs stable and neurovascularly intact to the right foot. Patient was then transferred back to the floor.      Electronically signed by Elana Mar DPM on 6/18/2024 at 6:19 PM

## 2024-06-18 NOTE — PROGRESS NOTES
Progress Note  Foot and Ankle Surgery    Subjective     CC: Right foot 2nd toe wound, osteomyelitis     Interval:   - Patient seen and examined at bedside during rounding.  - No acute events overnight. Vital signs stable at this time.  - Patient denies any pain to his foot.    - Denies any constitutional symptoms.  - Plan for OR today 6/18/24 at 7:30am.    Updated Labs:     WBC: 7.2->9.2->6.3->6.0->  Lab Results   Component Value Date    WBC 5.6 06/17/2024     ESR: 34->23->9->  Lab Results   Component Value Date    SEDRATE 13 06/17/2024     CRP: 16.3->27.8->16.8->  Lab Results   Component Value Date    CRP 13.5 (H) 06/17/2024         HPI :  Andi Nava is a 53 y.o. male seen at [facility] for a wound to the second digit with associated cellulitis.  Patient states that he stubbed his toe in the shower roughly 7 to 10 days ago and since that time is noticed an increase in swelling, redness and pain to the area.  He explains that he became increasingly concerned for infection to the area so presented to the hospital for further evaluation.  Denies any recent antibiotic use.  Denies outpatient follow-up with a podiatrist.  Denies any systemic signs of infection including nausea, vomiting, fever, chills or diarrhea.  No other pedal complaints at this time.     PCP is Anil Koehler MD    ROS: Denies N/V/F/C/SOB/CP.  Otherwise negative except at stated in the HPI.     Medications:  Scheduled Meds:   sodium chloride flush  5-40 mL IntraVENous 2 times per day    enoxaparin  40 mg SubCUTAneous Daily    piperacillin-tazobactam  3,375 mg IntraVENous Q8H    vancomycin  1,250 mg IntraVENous Q12H    vancomycin (VANCOCIN) intermittent dosing (placeholder)   Other RX Placeholder    aspirin  81 mg Oral Daily    atorvastatin  80 mg Oral Nightly    gabapentin  200 mg Oral TID    glipiZIDE  20 mg Oral BID AC    lisinopril  10 mg Oral Daily    [Held by provider] metFORMIN  1,000 mg Oral BID WC    pioglitazone  30 mg Oral  distended.    Lower Extremity Physical Exam: Last LE PE 6/17/24. Dressings left intact for OR today at 7:30am  Vascular: DP and PT pulses are palpable. CFT <5 seconds to all digits.  Hair growth is absent to the level of the digits.  Mild edema present to right lower extremity and moderate edema to right 2nd digit.      Neuro: Saph/sural/SP/DP/plantar sensation diminished to light touch.     Musculoskeletal: Muscle strength is 5/5 to all lower extremity muscle groups. Gross deformity is absent.  No tenderness to palpation to right second digit wound.      Dermatologic:   Full thickness ulcer #1 located to the right foot distal tuft of the second digit. Base is fibrogranular. Periwound skin is hyperkeratotic. No purulent drainage or malodor. Does not probe to bone, sinus track, or undermine. No fluctuance, crepitus, or induration. Interdigital maceration absent.     Clinical Images:  6/17/24              Imaging:   MRI FOOT RIGHT W WO CONTRAST   Final Result   1. Suspected shallow soft tissue defect of the distal aspect of the 2nd toe   with underlying subcutaneous edema extending along the dorsum of the foot   compatible with cellulitis.  No abscess identified.   2. Bone marrow edema and confluent decreased T1 signal throughout the 2nd   distal phalanx suspicious for osteomyelitis.   3. Bone marrow edema and confluent decreased T1 signal throughout the 1st   proximal phalanx with a questionable nondisplaced fracture of the shaft.   Given the lack of an adjacent abscess or deep soft tissue ulcer osteomyelitis   is less likely.   4. Nonspecific marrow edema and confluent decreased T1 signal throughout the   5th proximal phalanx.  Recommend correlation with an adjacent soft tissue   defect that would raise the possibility of osteomyelitis.         XR FOOT RIGHT (MIN 3 VIEWS)   Final Result   Periosteal reaction about the 1st proximal phalanx which can be seen in the   setting of osteomyelitis.  Further evaluation with

## 2024-06-18 NOTE — ANESTHESIA PRE PROCEDURE
4.07 06/17/2024 07:03 AM    HGB 14.3 06/17/2024 07:03 AM    HCT 41.2 06/17/2024 07:03 AM    .1 06/17/2024 07:03 AM    RDW 12.3 06/17/2024 07:03 AM     06/17/2024 07:03 AM       CMP:   Lab Results   Component Value Date/Time     06/17/2024 07:03 AM    K 4.6 06/17/2024 07:03 AM     06/17/2024 07:03 AM    CO2 23 06/17/2024 07:03 AM    BUN 23 06/17/2024 07:03 AM    CREATININE 1.0 06/17/2024 07:03 AM    LABGLOM >90 06/17/2024 07:03 AM    LABGLOM >60 06/12/2023 07:13 AM    GLUCOSE 136 06/17/2024 07:03 AM    CALCIUM 9.7 06/17/2024 07:03 AM    BILITOT 0.3 06/17/2024 07:03 AM    ALKPHOS 67 06/17/2024 07:03 AM    AST 15 06/17/2024 07:03 AM    ALT 18 06/17/2024 07:03 AM       POC Tests:   Recent Labs     06/18/24  0644   POCGLU 137*       Coags:   Lab Results   Component Value Date/Time    PROTIME 11.6 06/12/2023 07:13 AM    INR 0.9 06/12/2023 07:13 AM    APTT 28.8 06/12/2023 07:13 AM       HCG (If Applicable): No results found for: \"PREGTESTUR\", \"PREGSERUM\", \"HCG\", \"HCGQUANT\"     ABGs: No results found for: \"PHART\", \"PO2ART\", \"HIG5CZQ\", \"SGO0MFE\", \"BEART\", \"C5QLENXR\"     Type & Screen (If Applicable):  No results found for: \"LABABO\"    Drug/Infectious Status (If Applicable):  No results found for: \"HIV\", \"HEPCAB\"    COVID-19 Screening (If Applicable): No results found for: \"COVID19\"        Anesthesia Evaluation  Patient summary reviewed and Nursing notes reviewed   no history of anesthetic complications:   Airway: Mallampati: II  TM distance: >3 FB   Neck ROM: full  Mouth opening: > = 3 FB   Dental:          Pulmonary:Negative Pulmonary ROS and normal exam  breath sounds clear to auscultation                             Cardiovascular:  Exercise tolerance: no interval change  (+) hyperlipidemia        Rhythm: regular  Rate: normal                    Neuro/Psych:   (+) neuromuscular disease:             ROS comment: Diabetic peripheral neuropathy GI/Hepatic/Renal:             Endo/Other:    (+)

## 2024-06-18 NOTE — PLAN OF CARE
Problem: Discharge Planning  Goal: Discharge to home or other facility with appropriate resources  6/18/2024 0047 by Kacie Tuttle RN  Outcome: Progressing  Flowsheets (Taken 6/18/2024 0028)  Discharge to home or other facility with appropriate resources:   Identify barriers to discharge with patient and caregiver   Arrange for needed discharge resources and transportation as appropriate   Identify discharge learning needs (meds, wound care, etc)     Problem: Pain  Goal: Verbalizes/displays adequate comfort level or baseline comfort level  6/18/2024 0047 by Kacie Tuttle RN  Outcome: Progressing     Problem: Safety - Adult  Goal: Free from fall injury  6/18/2024 0047 by Kacie Tuttle RN  Outcome: Progressing     Problem: Chronic Conditions and Co-morbidities  Goal: Patient's chronic conditions and co-morbidity symptoms are monitored and maintained or improved  Outcome: Progressing  Flowsheets (Taken 6/18/2024 0028)  Care Plan - Patient's Chronic Conditions and Co-Morbidity Symptoms are Monitored and Maintained or Improved:   Monitor and assess patient's chronic conditions and comorbid symptoms for stability, deterioration, or improvement   Collaborate with multidisciplinary team to address chronic and comorbid conditions and prevent exacerbation or deterioration   Update acute care plan with appropriate goals if chronic or comorbid symptoms are exacerbated and prevent overall improvement and discharge

## 2024-06-18 NOTE — ANESTHESIA POSTPROCEDURE EVALUATION
Department of Anesthesiology  Postprocedure Note    Patient: Andi Nava  MRN: [id number]  YOB: 1970  Date of evaluation: 6/18/2024    Procedure Summary       Date: 06/18/24 Room / Location: [address]    Anesthesia Start: 0721 Anesthesia Stop: 0818    Procedure: RIGHT SECOND TOE AMPUTATION (Right: Second Toe) Diagnosis:       Osteomyelitis of right foot, unspecified type (HCC)      (Osteomyelitis of right foot, unspecified type (HCC) [M86.9])    Surgeons: Leticia Taylor DPM Responsible Provider: Davin Martin MD    Anesthesia Type: MAC ASA Status: 3            Anesthesia Type: No value filed.    Margarito Phase I: Margarito Score: 10    Margarito Phase II:      Anesthesia Post Evaluation    Patient location during evaluation: PACU  Patient participation: complete - patient participated  Level of consciousness: awake and awake and alert  Pain score: 2  Nausea & Vomiting: no nausea and no vomiting  Cardiovascular status: hemodynamically stable and blood pressure returned to baseline  Respiratory status: acceptable  Hydration status: euvolemic  Multimodal analgesia pain management approach  Pain management: adequate    No notable events documented.

## 2024-06-18 NOTE — BRIEF OP NOTE
PODIATRY BRIEF OP NOTE    PATIENT NAME: Andi Nava  YOB: 1970  -  53 y.o. male  MRN: [id number]  DATE: 6/18/2024  BILLING #: [id number]    Surgeon(s):  Letciia Taylor DPM     ASSISTANTS: Elana Mar DPM     PRE-OP DIAGNOSIS:   Diabetic ulceration down to layer of subcutaneous tissue, right second digit  Osteomyelitis, right second digit  Cellulitis, right foot  Right foot pain  Type 2 diabetes with peripheral neuropathy    POST-OP DIAGNOSIS: Same as above.    PROCEDURE:   Partial 2nd digit amputation, right foot    ANESTHESIA: MAC    HEMOSTASIS: Direct pressure    ESTIMATED BLOOD LOSS: Less than 5cc.    MATERIALS:   * No implants in log *    INJECTABLES: 10 cc mix of 1% lidocaine plain and 0.5% marcaine plain    SPECIMEN:   ID Type Source Tests Collected by Time Destination   1 : RIGHT 2ND TOE Bone Toe CULTURE, ANAEROBIC AND AEROBIC Leticia Taylor DPM 6/18/2024 0745    A : RIGHT 2ND TOE Tissue Tissue SURGICAL PATHOLOGY Leticia Taylor DPM 6/18/2024 0742        COMPLICATIONS: None    FINDINGS: Ulceration to right distal 2nd digit noted. Distal phalanx 2nd digit right foot amputated and sent for pathology/culture. Distal aspect of middle phalanx removed via rongeur and sent for pathology and culture. Closed with suture. Dressings applied: adaptic, 4x4s, kerlix, ace.     Elana Mar DPM   Podiatric Medicine & Surgery   6/18/2024 at 8:19 AM

## 2024-06-19 VITALS
SYSTOLIC BLOOD PRESSURE: 144 MMHG | OXYGEN SATURATION: 97 % | HEIGHT: 72 IN | BODY MASS INDEX: 28.7 KG/M2 | TEMPERATURE: 98.4 F | WEIGHT: 211.86 LBS | DIASTOLIC BLOOD PRESSURE: 85 MMHG | RESPIRATION RATE: 15 BRPM | HEART RATE: 81 BPM

## 2024-06-19 LAB
ALBUMIN SERPL-MCNC: 3.9 G/DL (ref 3.5–5.2)
ALBUMIN/GLOB SERPL: 1.6 {RATIO} (ref 1–2.5)
ALP SERPL-CCNC: 59 U/L (ref 40–129)
ALT SERPL-CCNC: 21 U/L (ref 5–41)
ANION GAP SERPL CALCULATED.3IONS-SCNC: 10 MMOL/L (ref 9–17)
AST SERPL-CCNC: 16 U/L
BASOPHILS # BLD: 0.1 K/UL (ref 0–0.2)
BASOPHILS NFR BLD: 1 % (ref 0–2)
BILIRUB SERPL-MCNC: 0.2 MG/DL (ref 0.3–1.2)
BUN SERPL-MCNC: 21 MG/DL (ref 6–20)
CALCIUM SERPL-MCNC: 9.5 MG/DL (ref 8.6–10.4)
CHLORIDE SERPL-SCNC: 111 MMOL/L (ref 98–107)
CO2 SERPL-SCNC: 24 MMOL/L (ref 20–31)
CREAT SERPL-MCNC: 1 MG/DL (ref 0.7–1.2)
CRP SERPL HS-MCNC: 9.9 MG/L (ref 0–5)
EOSINOPHIL # BLD: 0.2 K/UL (ref 0–0.4)
EOSINOPHILS RELATIVE PERCENT: 4 % (ref 1–4)
ERYTHROCYTE [DISTWIDTH] IN BLOOD BY AUTOMATED COUNT: 12.6 % (ref 12.5–15.4)
ERYTHROCYTE [SEDIMENTATION RATE] IN BLOOD BY PHOTOMETRIC METHOD: 21 MM/HR (ref 0–20)
GFR, ESTIMATED: >90 ML/MIN/1.73M2
GLUCOSE BLD-MCNC: 120 MG/DL (ref 75–110)
GLUCOSE BLD-MCNC: 129 MG/DL (ref 75–110)
GLUCOSE BLD-MCNC: 157 MG/DL (ref 75–110)
GLUCOSE SERPL-MCNC: 141 MG/DL (ref 70–99)
HCT VFR BLD AUTO: 39.4 % (ref 41–53)
HGB BLD-MCNC: 13.6 G/DL (ref 13.5–17.5)
LYMPHOCYTES NFR BLD: 1 K/UL (ref 1–4.8)
LYMPHOCYTES RELATIVE PERCENT: 17 % (ref 24–44)
MCH RBC QN AUTO: 35.3 PG (ref 26–34)
MCHC RBC AUTO-ENTMCNC: 34.6 G/DL (ref 31–37)
MCV RBC AUTO: 101.9 FL (ref 80–100)
MICROORGANISM SPEC CULT: NORMAL
MICROORGANISM SPEC CULT: NORMAL
MONOCYTES NFR BLD: 0.8 K/UL (ref 0.1–1.2)
MONOCYTES NFR BLD: 13 % (ref 2–11)
NEUTROPHILS NFR BLD: 65 % (ref 36–66)
NEUTS SEG NFR BLD: 4.1 K/UL (ref 1.8–7.7)
PLATELET # BLD AUTO: 243 K/UL (ref 140–450)
PMV BLD AUTO: 8.1 FL (ref 6–12)
POTASSIUM SERPL-SCNC: 4.1 MMOL/L (ref 3.7–5.3)
PROT SERPL-MCNC: 6.4 G/DL (ref 6.4–8.3)
RBC # BLD AUTO: 3.86 M/UL (ref 4.5–5.9)
SERVICE CMNT-IMP: NORMAL
SERVICE CMNT-IMP: NORMAL
SODIUM SERPL-SCNC: 145 MMOL/L (ref 135–144)
SPECIMEN DESCRIPTION: NORMAL
SPECIMEN DESCRIPTION: NORMAL
WBC OTHER # BLD: 6.3 K/UL (ref 3.5–11)

## 2024-06-19 PROCEDURE — 86140 C-REACTIVE PROTEIN: CPT

## 2024-06-19 PROCEDURE — 2580000003 HC RX 258

## 2024-06-19 PROCEDURE — 97116 GAIT TRAINING THERAPY: CPT

## 2024-06-19 PROCEDURE — 97162 PT EVAL MOD COMPLEX 30 MIN: CPT

## 2024-06-19 PROCEDURE — 6370000000 HC RX 637 (ALT 250 FOR IP)

## 2024-06-19 PROCEDURE — 85025 COMPLETE CBC W/AUTO DIFF WBC: CPT

## 2024-06-19 PROCEDURE — 97530 THERAPEUTIC ACTIVITIES: CPT

## 2024-06-19 PROCEDURE — 82947 ASSAY GLUCOSE BLOOD QUANT: CPT

## 2024-06-19 PROCEDURE — 6360000002 HC RX W HCPCS

## 2024-06-19 PROCEDURE — 99232 SBSQ HOSP IP/OBS MODERATE 35: CPT | Performed by: INTERNAL MEDICINE

## 2024-06-19 PROCEDURE — 36415 COLL VENOUS BLD VENIPUNCTURE: CPT

## 2024-06-19 PROCEDURE — 85652 RBC SED RATE AUTOMATED: CPT

## 2024-06-19 PROCEDURE — 80053 COMPREHEN METABOLIC PANEL: CPT

## 2024-06-19 RX ORDER — CEFDINIR 300 MG/1
300 CAPSULE ORAL 2 TIMES DAILY
Qty: 20 CAPSULE | Refills: 0 | Status: SHIPPED | OUTPATIENT
Start: 2024-06-19 | End: 2024-06-29

## 2024-06-19 RX ORDER — DOXYCYCLINE HYCLATE 100 MG/1
100 CAPSULE ORAL 2 TIMES DAILY
Qty: 20 CAPSULE | Refills: 0 | Status: SHIPPED | OUTPATIENT
Start: 2024-06-19 | End: 2024-06-29

## 2024-06-19 RX ORDER — HYDROCODONE BITARTRATE AND ACETAMINOPHEN 5; 325 MG/1; MG/1
1 TABLET ORAL EVERY 8 HOURS PRN
Qty: 15 TABLET | Refills: 0 | Status: SHIPPED | OUTPATIENT
Start: 2024-06-19 | End: 2024-06-24

## 2024-06-19 RX ADMIN — ENOXAPARIN SODIUM 40 MG: 100 INJECTION SUBCUTANEOUS at 09:34

## 2024-06-19 RX ADMIN — SODIUM CHLORIDE: 9 INJECTION, SOLUTION INTRAVENOUS at 11:03

## 2024-06-19 RX ADMIN — PIPERACILLIN AND TAZOBACTAM 3375 MG: 3; .375 INJECTION, POWDER, LYOPHILIZED, FOR SOLUTION INTRAVENOUS at 01:55

## 2024-06-19 RX ADMIN — GLIPIZIDE 20 MG: 5 TABLET ORAL at 06:11

## 2024-06-19 RX ADMIN — LISINOPRIL 10 MG: 10 TABLET ORAL at 09:33

## 2024-06-19 RX ADMIN — ASPIRIN 81 MG: 81 TABLET, COATED ORAL at 09:33

## 2024-06-19 RX ADMIN — EMPAGLIFLOZIN 25 MG: 10 TABLET, FILM COATED ORAL at 09:32

## 2024-06-19 RX ADMIN — GABAPENTIN 200 MG: 100 CAPSULE ORAL at 09:33

## 2024-06-19 RX ADMIN — GLIPIZIDE 20 MG: 5 TABLET ORAL at 16:46

## 2024-06-19 RX ADMIN — GABAPENTIN 200 MG: 100 CAPSULE ORAL at 14:12

## 2024-06-19 RX ADMIN — SODIUM CHLORIDE 1250 MG: 9 INJECTION, SOLUTION INTRAVENOUS at 06:15

## 2024-06-19 ASSESSMENT — ENCOUNTER SYMPTOMS
RESPIRATORY NEGATIVE: 1
GASTROINTESTINAL NEGATIVE: 1

## 2024-06-19 NOTE — PLAN OF CARE
Problem: Discharge Planning  Goal: Discharge to home or other facility with appropriate resources  6/19/2024 1316 by Corina Zamora LPN  Outcome: Progressing  Flowsheets (Taken 6/19/2024 0810)  Discharge to home or other facility with appropriate resources: Identify barriers to discharge with patient and caregiver  6/19/2024 0246 by Yulisa Tolbert RN  Outcome: Progressing  Flowsheets (Taken 6/18/2024 2115)  Discharge to home or other facility with appropriate resources:   Identify barriers to discharge with patient and caregiver   Arrange for needed discharge resources and transportation as appropriate   Identify discharge learning needs (meds, wound care, etc)   Arrange for interpreters to assist at discharge as needed     Problem: Pain  Goal: Verbalizes/displays adequate comfort level or baseline comfort level  6/19/2024 1316 by Corina Zamora LPN  Outcome: Progressing  6/19/2024 0246 by Yulisa Tolbert RN  Outcome: Progressing     Problem: Safety - Adult  Goal: Free from fall injury  6/19/2024 1316 by Corina Zamora LPN  Outcome: Progressing  6/19/2024 0246 by Yulisa Tolbert RN  Outcome: Progressing     Problem: Chronic Conditions and Co-morbidities  Goal: Patient's chronic conditions and co-morbidity symptoms are monitored and maintained or improved  6/19/2024 1316 by Corina Zamora LPN  Outcome: Progressing  Flowsheets (Taken 6/19/2024 0810)  Care Plan - Patient's Chronic Conditions and Co-Morbidity Symptoms are Monitored and Maintained or Improved:   Monitor and assess patient's chronic conditions and comorbid symptoms for stability, deterioration, or improvement   Collaborate with multidisciplinary team to address chronic and comorbid conditions and prevent exacerbation or deterioration  6/19/2024 0246 by Yulisa Tolbert, RN  Outcome: Progressing  Flowsheets (Taken 6/18/2024 2115)  Care Plan - Patient's Chronic Conditions and Co-Morbidity Symptoms are Monitored and Maintained or Improved:   Monitor and  assess patient's chronic conditions and comorbid symptoms for stability, deterioration, or improvement   Collaborate with multidisciplinary team to address chronic and comorbid conditions and prevent exacerbation or deterioration   Update acute care plan with appropriate goals if chronic or comorbid symptoms are exacerbated and prevent overall improvement and discharge

## 2024-06-19 NOTE — DISCHARGE SUMMARY
Patient is appropriate to discharge from medical standpoint. Spouse at bedside. Discharge instructions were read aloud to both patient and wife; both expressed verbal frustration with inpatient and discharging process. Spouse expresses frustration with length of stay and delay of procedure. Patient stated he \"has not seen podiatry since his surgery.\" Writer read aloud postoperative instructions to patient and spouse; both expresses understanding with no questions or concerns regarding instructions and follow up appointments. Order given to patient for walker in which spouse expressed frustration. Writer told spouse the patient could stay overnight to be able to leave with walker tomorrow; both refused and expressed wanting to discharge at this time. Patient transported to personal vehicle via wheelchair. Ambulated well independently. Spouse is driving. All personal belongings sent home.     Electronically signed by Corina Zamora LPN on 6/19/2024 at 7:15 PM

## 2024-06-19 NOTE — DISCHARGE INSTRUCTIONS
Podiatric Post Operative Instructions:  You have had a surgical procedure on your right foot.      Fluids and Diet:  With your history of diabetes, please lower your intake sugar content food as this will negatively impact surgery.    Medications:  Take your prescriptions as directed  You are receiving new prescriptions for [place]  If your pain is not severe then you may take the non-prescription medication that you normally take for aches and pains ie Tylenol and Ibuprofen (alternating), or if severe pain occurs these will serve as additional medication in conjuction with the Norco  You may resume your regularly scheduled medications (unless otherwise directed)  If any side effects or adverse reactions occur, discontinue the medication and contact your doctor.  Review the patient drug information that is provided before you take any medication    Ambulation and Activity:  You are advised to go directly home from the hospital  Use walker as needed  You may put weight on the operated foot to heel.  You should wear the surgical shoe at all times when awake.   Avoid stairs.  Do not lift or move heavy objects  Do not drive until cleared by your physician.    Bandage and Wound Care Instructions:  Keep bandage clean and dry  Do NOT remove dressing/ splint  DO NOT get wet  Please use shower cover around leg if you do shower so that dressing does not get wet  Do not shower or bathe the operative extremity  Do not remove the bandage (unless otherwise directed)   Do not attempt to put anything between the cast or dressing and your skin, some itching is normal.    Ice and Elevation:  Elevate operative extremity as much as possible to reduce swelling and discomfort.  Elevate with 2 pillows at or above the level of the heart for the first 72 hours. This is important for post operative swelling and pain  Ice:  Apply Hospital dispensed insulated ice bag over the bandage 20 minutes of every hour while awake for the first 72

## 2024-06-19 NOTE — PROGRESS NOTES
Yes  Ambulation Assistance: Independent  Transfer Assistance: Independent  Active : Yes  Occupation: Full time employment  Type of Occupation: Manages [employer]  Vision/Hearing  Vision  Vision: Impaired  Vision Exceptions: Wears glasses for distance  Hearing  Hearing: Within functional limits    Cognition   Orientation  Overall Orientation Status: Within Normal Limits  Orientation Level: Oriented X4  Cognition  Overall Cognitive Status: Exceptions  Arousal/Alertness: Appears intact  Following Commands: Appears intact  Attention Span: Appears intact  Memory: Appears intact  Safety Judgement: Decreased awareness of need for assistance;Decreased awareness of need for safety  Problem Solving: Decreased awareness of errors;Assistance required to identify errors made;Assistance required to correct errors made  Insights: Decreased awareness of deficits  Initiation: Requires cues for some  Sequencing: Requires cues for some     Objective           Gross Assessment  AROM: Within functional limits  PROM: Within functional limits  Strength: Within functional limits  Coordination: Within functional limits  Tone: Normal  Sensation: Impaired (Neuropathy (B) LE's)     AROM RLE (degrees)  RLE AROM: WNL  AROM LLE (degrees)  LLE AROM : WNL  Strength RLE  Strength RLE: Exception  R Hip Flexion: 4/5  Strength LLE  Strength LLE: Exception  Comment: grossly 4/5           Bed mobility  Supine to Sit: Independent  Sit to Supine: Independent  Scooting: Independent  Transfers  Sit to Stand: Contact guard assistance;Stand by assistance  Stand to Sit: Stand by assistance  Stand Pivot Transfers: Contact guard assistance;Stand by assistance  Comment: Transfers with RW. Pt with verbal cues for proper technique and sequencing as well as WB restrictions; pt able to heel WB only during transfers for balance purposes.  Ambulation  WB Status: NWB (R) LE with heel WB for transfers only  Ambulation  Surface: Level tile  Device: Rolling  A Little  How much help is needed standing up from a chair using your arms?: A Little  How much help is needed walking in hospital room?: A Little  How much help is needed climbing 3-5 steps with a railing?: A Little  AM-PAC Inpatient Mobility Raw Score : 20  AM-PAC Inpatient T-Scale Score : 47.67  Mobility Inpatient CMS 0-100% Score: 35.83  Mobility Inpatient CMS G-Code Modifier : CJ         Tinneti Score       Goals  Short Term Goals  Time Frame for Short Term Goals: 14 visits  Short Term Goal 1: Modified (I) transfers with RW without LOB.  Short Term Goal 2: Pt to ambulate with ' Modified (I) while maintaining NWB (R) LE.  Short Term Goal 3: Pt to negotiate 2 steps with 1 rail and modifications as needed for entry into home.  Short Term Goal 4: Improve standing static/dynamic balance to Good with RW to minimize fall risk.  Patient Goals   Patient Goals : Return home       Education  Patient Education  Education Given To: Patient  Education Provided: Role of Therapy;Plan of Care;Precautions;Equipment;Transfer Training;Fall Prevention Strategies  Education Provided Comments: WB restrictions, walker use and safety, stair negotiation  Education Method: Demonstration;Verbal;Teach Back  Barriers to Learning: None  Education Outcome: Verbalized understanding;Demonstrated understanding;Continued education needed      Therapy Time   Individual Concurrent Group Co-treatment   Time In 0828         Time Out 0917         Minutes 49         Timed Code Treatment Minutes: 40 Minutes       Abigail Garcia, PT

## 2024-06-19 NOTE — PLAN OF CARE
Problem: Discharge Planning  Goal: Discharge to home or other facility with appropriate resources  6/19/2024 0246 by Yulisa Tolbert RN  Outcome: Progressing  Flowsheets (Taken 6/18/2024 2115)  Discharge to home or other facility with appropriate resources:   Identify barriers to discharge with patient and caregiver   Arrange for needed discharge resources and transportation as appropriate   Identify discharge learning needs (meds, wound care, etc)   Arrange for interpreters to assist at discharge as needed  6/18/2024 1928 by Dea Lopez LPN  Outcome: Progressing     Problem: Pain  Goal: Verbalizes/displays adequate comfort level or baseline comfort level  6/19/2024 0246 by Yulisa Tolbert RN  Outcome: Progressing  6/18/2024 1928 by Dea Lopez LPN  Outcome: Progressing  Flowsheets  Taken 6/18/2024 0845 by Temitope Canas RN  Verbalizes/displays adequate comfort level or baseline comfort level: Assess pain using appropriate pain scale  Taken 6/18/2024 0830 by Temitope Canas RN  Verbalizes/displays adequate comfort level or baseline comfort level: Assess pain using appropriate pain scale  Taken 6/18/2024 0815 by Temitope Canas RN  Verbalizes/displays adequate comfort level or baseline comfort level: Assess pain using appropriate pain scale     Problem: Safety - Adult  Goal: Free from fall injury  6/19/2024 0246 by Yulisa Tolbert RN  Outcome: Progressing  6/18/2024 1928 by Dea Lopez LPN  Outcome: Progressing     Problem: Chronic Conditions and Co-morbidities  Goal: Patient's chronic conditions and co-morbidity symptoms are monitored and maintained or improved  6/19/2024 0246 by Yulisa Tolbert RN  Outcome: Progressing  Flowsheets (Taken 6/18/2024 2115)  Care Plan - Patient's Chronic Conditions and Co-Morbidity Symptoms are Monitored and Maintained or Improved:   Monitor and assess patient's chronic conditions and comorbid symptoms for stability, deterioration, or improvement   Collaborate

## 2024-06-19 NOTE — PROGRESS NOTES
Occupational Therapy    [facility]  Occupational Therapy Not Seen Note    DATE: 2024    NAME: Andi Nava  MRN: [id number]   : 1970      Patient not seen this date for Occupational Therapy due to:    Patient declined need for OT acute services. Patient has no concerns or questions at this time. PT to follow for mobility. Discharge acute OT.    Electronically signed by JOSSE LANCASTER OTR/L on 2024 at 11:02 AM

## 2024-06-19 NOTE — DISCHARGE INSTR - COC
Continuity of Care Form    Patient Name: Andi Nava   :  1970  MRN:  [id number]    Admit date:  2024  Discharge date:  ***    Code Status Order: Full Code   Advance Directives:   Advance Care Flowsheet Documentation       Date/Time Healthcare Directive Type of Healthcare Directive Copy in Chart Healthcare Agent Appointed Healthcare Agent's Name Healthcare Agent's Phone Number    24 0701 No, patient does not have an advance directive for healthcare treatment -- -- -- -- --            Admitting Physician:  Norberto Canas DPM  PCP: Anil Koehler MD    Discharging Nurse: ***  Discharging Hospital Unit/Room#: 318/318-01  Discharging Unit Phone Number: ***    Emergency Contact:   Extended Emergency Contact Information  Primary Emergency Contact: Cindy Nava  Mobile Phone: [phone]  Relation: Spouse  Preferred language: English   needed? No    Past Surgical History:  Past Surgical History:   Procedure Laterality Date    TOE AMPUTATION Right 2024    RIGHT SECOND TOE AMPUTATION performed by Leticia Taylor DPM at [facility] OR       Immunization History:   Immunization History   Administered Date(s) Administered    COVID-19, J&J, (age 18y+), IM, 0.5 mL 2021       Active Problems:  Patient Active Problem List   Diagnosis Code    Ataxia R27.0    Left leg numbness R20.0    Gait difficulty R26.9    Stroke-like symptoms R29.90    Cellulitis of foot L03.119    Diabetic peripheral neuropathy (HCC) E11.42    Type 1 diabetes mellitus with hyperglycemia ([facility]) E10.65    Dyslipidemia E78.5    Osteomyelitis of second toe of left foot (HCC) M86.9    Type 2 diabetes mellitus with diabetic foot infection (HCC) E11.628, L08.9    Cellulitis of left foot L03.116    Acute osteomyelitis of toe, right (HCC) M86.171    Osteomyelitis of right foot (HCC) M86.9    Diabetic foot ulcer with osteomyelitis (HCC) E11.621, E11.69, L97.509, M86.9       Isolation/Infection:   Isolation            No  Isolation          Patient Infection Status       None to display            Nurse Assessment:  Last Vital Signs: BP (!) 144/85   Pulse 81   Temp 98.4 °F (36.9 °C) (Oral)   Resp 15   Ht 1.829 m (6')   Wt 96.1 kg (211 lb 13.8 oz)   SpO2 97%   BMI 28.73 kg/m²     Last documented pain score (0-10 scale): Pain Level: 0  Last Weight:   Wt Readings from Last 1 Encounters:   06/16/24 96.1 kg (211 lb 13.8 oz)     Mental Status:  {IP PT MENTAL STATUS:20030}    IV Access:  { ANNE IV ACCESS:189904132}    Nursing Mobility/ADLs:  Walking   {CHP DME ADLs:503195381}  Transfer  {CHP DME ADLs:967336066}  Bathing  {CHP DME ADLs:801823815}  Dressing  {CHP DME ADLs:218726885}  Toileting  {CHP DME ADLs:491839807}  Feeding  {CHP DME ADLs:394272576}  Med Admin  {CHP DME ADLs:153122667}  Med Delivery   { ANNE MED Delivery:289979171}    Wound Care Documentation and Therapy:  Wound 06/18/24 Toe (Comment  which one) Right;Anterior right second toe (Active)   Wound Etiology Surgical 06/19/24 0810   Dressing Status Clean;Dry;Intact 06/19/24 0810   Wound Cleansed Not Cleansed 06/19/24 0810   Dressing/Treatment Ace wrap 06/19/24 0810   Offloading for Diabetic Foot Ulcers Offloading ordered 06/19/24 0810   Drainage Amount None (dry) 06/19/24 0810   Odor None 06/18/24 2115   Number of days: 1       Incision 06/18/24 Toe (Comment  which one) Right;Anterior (Active)   Dressing Status Clean;Dry;Intact 06/18/24 2115   Incision Cleansed Cleansed with saline 06/18/24 0755   Dressing/Treatment Ace wrap 06/18/24 2115   Number of days: 1        Elimination:  Continence:   Bowel: {YES / NO:19727}  Bladder: {YES / NO:19727}  Urinary Catheter: {Urinary Catheter:127708153}   Colostomy/Ileostomy/Ileal Conduit: {YES / NO:19727}       Date of Last BM: ***  No intake or output data in the 24 hours ending 06/19/24 1810  I/O last 3 completed shifts:  In: 300 [I.V.:300]  Out: -     Safety Concerns:     {[facility] Safety

## 2024-06-19 NOTE — DISCHARGE SUMMARY
Physician Discharge Summary     Patient ID:  Andi Nava  [id number]  53 y.o.  1970    Admit date: 6/14/2024    Discharge date and time: No discharge date for patient encounter.     Admitting Physician: Norberto Canas DPM     Discharge Physician: Leticia Taylor DPM    Admission Diagnoses: Cellulitis of foot [L03.119]  Cellulitis of left foot [L03.116]  Osteomyelitis of second toe of left foot (HCC) [M86.9]    Discharge Diagnoses: Osteomyelitis     Admission Condition: fair    Discharged Condition: fair    Indication for Admission: Right foot 2nd toe wound     Hospital Course: ED for right foot 2nd toe wound     Consults: ID and IM    Significant Diagnostic Studies: labs: WBC, ESR, CRP and radiology: X-Ray: right foot and MRI: right foot    Treatments: antibiotics: vancomycin and Zosyn, anticoagulation: LMW heparin, insulin: Humalog, and surgery: partial right foot 2nd digit amputation    Discharge Exam:  BP (!) 144/85   Pulse 81   Temp 98.4 °F (36.9 °C) (Oral)   Resp 15   Ht 1.829 m (6')   Wt 96.1 kg (211 lb 13.8 oz)   SpO2 97%   BMI 28.73 kg/m²     General Appearance:    Alert, cooperative, no distress, appears stated age   Head:    Normocephalic, without obvious abnormality, atraumatic   Eyes:    PERRL, conjunctiva/corneas clear, EOM's intact, fundi     benign, both eyes        Ears:    Normal TM's and external ear canals, both ears   Nose:   Nares normal, septum midline, mucosa normal, no drainage    or sinus tenderness   Throat:   Lips, mucosa, and tongue normal; teeth and gums normal   Neck:   Supple, symmetrical, trachea midline, no adenopathy;        thyroid:  No enlargement/tenderness/nodules; no carotid    bruit or JVD   Back:     Symmetric, no curvature, ROM normal, no CVA tenderness   Lungs:     Clear to auscultation bilaterally, respirations unlabored   Chest wall:    No tenderness or deformity   Heart:    Regular rate and rhythm, S1 and S2 normal, no murmur, rub   or gallop   Abdomen:

## 2024-06-19 NOTE — PROGRESS NOTES
Plan of care note  Foot and Ankle Surgery     Plan of care      Patient stable for discharge pending approval from all other teams and pending ID discharge antibiotic recs.  Discharge instructions and.  Order placed for walker 2W.  Patient weightbearing as tolerated to heel of right foot with surgical shoe.    Elana Mar DPM  Foot and Ankle Surgery   06/19/24 at 5:03 PM

## 2024-06-20 LAB — SURGICAL PATHOLOGY REPORT: NORMAL

## 2024-06-23 LAB
MICROORGANISM SPEC CULT: NORMAL
MICROORGANISM SPEC CULT: NORMAL
MICROORGANISM/AGENT SPEC: NORMAL
MICROORGANISM/AGENT SPEC: NORMAL
SERVICE CMNT-IMP: NORMAL
SPECIMEN DESCRIPTION: NORMAL

## 2024-06-25 ENCOUNTER — OFFICE VISIT (OUTPATIENT)
Dept: PODIATRY | Age: 54
End: 2024-06-25
Payer: COMMERCIAL

## 2024-06-25 VITALS — WEIGHT: 211 LBS | HEIGHT: 72 IN | BODY MASS INDEX: 28.58 KG/M2

## 2024-06-25 DIAGNOSIS — M86.171 ACUTE OSTEOMYELITIS OF TOE, RIGHT (HCC): ICD-10-CM

## 2024-06-25 DIAGNOSIS — S98.131A AMPUTATED TOE, RIGHT (HCC): Primary | ICD-10-CM

## 2024-06-25 DIAGNOSIS — E11.621 TYPE 2 DIABETES MELLITUS WITH DIABETIC TOE ULCER (HCC): ICD-10-CM

## 2024-06-25 DIAGNOSIS — L97.509 TYPE 2 DIABETES MELLITUS WITH DIABETIC TOE ULCER (HCC): ICD-10-CM

## 2024-06-25 PROCEDURE — 99213 OFFICE O/P EST LOW 20 MIN: CPT | Performed by: PODIATRIST

## 2024-06-25 PROCEDURE — 3052F HG A1C>EQUAL 8.0%<EQUAL 9.0%: CPT | Performed by: PODIATRIST

## 2024-06-25 NOTE — PROGRESS NOTES
[facility] Podiatry  Return Patient     Andi Nava 53 y.o. male that presents for follow-up of   Chief Complaint   Patient presents with    Post-Op Check     Right foot      Follow up from hospital stay and right partial second toe amputation done on 6/18/24. He is home and doing well. Kept dressing intact. Using crutches. 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     Currently denies F/C/N/V.     No Known Allergies    Past Medical History:   Diagnosis Date    Type 2 diabetes mellitus (HCC)        Prior to Admission medications    Medication Sig Start Date End Date Taking? Authorizing Provider   cefdinir (OMNICEF) 300 MG capsule Take 1 capsule by mouth 2 times daily for 10 days 6/19/24 6/29/24 Yes Yonatan Wood MD   doxycycline hyclate (VIBRAMYCIN) 100 MG capsule Take 1 capsule by mouth 2 times daily for 10 days 6/19/24 6/29/24 Yes Yonatan Wood MD   pioglitazone (ACTOS) 30 MG tablet Take 1 tablet by mouth nightly at bedtime 5/29/24  Yes Norma Ruiz MD   gabapentin (NEURONTIN) 100 MG capsule Take 2 capsules by mouth 3 times daily for 180 days. Intended supply: 90 days 6/6/24 12/3/24 Yes Alon Cho MD   atorvastatin (LIPITOR) 80 MG tablet TAKE 1 TABLET BY MOUTH EVERY DAY AT NIGHT 4/12/24  Yes Alon Cho MD   ASPIRIN LOW DOSE 81 MG EC tablet TAKE 1 TABLET BY MOUTH EVERY DAY 3/11/24  Yes Alon Cho MD   JARDIANCE 25 MG tablet Take 1 tablet by mouth daily 6/13/23  Yes Norma Ruiz MD   glipiZIDE (GLUCOTROL) 10 MG tablet TAKE 2 TABLETS BY MOUTH EVERY MORNING AND TAKE 2 TABLETS WITH DINNER 5/10/23  Yes Norma Ruiz MD   metFORMIN (GLUCOPHAGE) 500 MG tablet TAKE 2 TABLETS BY MOUTH IN THE AM & 2 TABLETS IN THE PM 5/9/23  Yes Norma Ruiz MD   nateglinide (STARLIX) 120 MG tablet TAKE 1 TABLET BY MOUTH THREE TIMES A DAY WITH MEALS 6/13/23  Yes Norma Ruiz MD   lisinopril (PRINIVIL;ZESTRIL) 10 MG tablet Take 1 tablet by mouth daily 4/27/23  Yes

## 2024-07-02 ENCOUNTER — OFFICE VISIT (OUTPATIENT)
Dept: PODIATRY | Age: 54
End: 2024-07-02
Payer: COMMERCIAL

## 2024-07-02 VITALS — HEIGHT: 74 IN | BODY MASS INDEX: 27.08 KG/M2 | WEIGHT: 211 LBS

## 2024-07-02 DIAGNOSIS — S98.131A AMPUTATED TOE, RIGHT (HCC): Primary | ICD-10-CM

## 2024-07-02 DIAGNOSIS — L97.509 TYPE 2 DIABETES MELLITUS WITH DIABETIC TOE ULCER (HCC): ICD-10-CM

## 2024-07-02 DIAGNOSIS — M86.171 ACUTE OSTEOMYELITIS OF TOE, RIGHT (HCC): ICD-10-CM

## 2024-07-02 DIAGNOSIS — E11.621 TYPE 2 DIABETES MELLITUS WITH DIABETIC TOE ULCER (HCC): ICD-10-CM

## 2024-07-02 PROCEDURE — 99212 OFFICE O/P EST SF 10 MIN: CPT | Performed by: PODIATRIST

## 2024-07-02 PROCEDURE — 3052F HG A1C>EQUAL 8.0%<EQUAL 9.0%: CPT | Performed by: PODIATRIST

## 2024-07-02 NOTE — PROGRESS NOTES
[facility] Podiatry  Return Patient     Andi Nava 53 y.o. male that presents for follow-up of   Chief Complaint   Patient presents with    Post-Op Check     Right foot 2nd toe    Diabetes     Last blood sugar 130     Follow up from hospital stay and right partial second toe amputation done on 6/18/24. He is home and doing well. Kept dressing intact. Using crutches. 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     Currently denies F/C/N/V.     No Known Allergies    Past Medical History:   Diagnosis Date    Type 2 diabetes mellitus (HCC)        Prior to Admission medications    Medication Sig Start Date End Date Taking? Authorizing Provider   pioglitazone (ACTOS) 30 MG tablet Take 1 tablet by mouth nightly at bedtime 5/29/24   Norma Ruiz MD   gabapentin (NEURONTIN) 100 MG capsule Take 2 capsules by mouth 3 times daily for 180 days. Intended supply: 90 days 6/6/24 12/3/24  Alon Cho MD   atorvastatin (LIPITOR) 80 MG tablet TAKE 1 TABLET BY MOUTH EVERY DAY AT NIGHT 4/12/24   Alon Cho MD   ASPIRIN LOW DOSE 81 MG EC tablet TAKE 1 TABLET BY MOUTH EVERY DAY 3/11/24   Alon Cho MD   JARDIANCE 25 MG tablet Take 1 tablet by mouth daily 6/13/23   Norma Ruiz MD   glipiZIDE (GLUCOTROL) 10 MG tablet TAKE 2 TABLETS BY MOUTH EVERY MORNING AND TAKE 2 TABLETS WITH DINNER 5/10/23   Norma Ruiz MD   metFORMIN (GLUCOPHAGE) 500 MG tablet TAKE 2 TABLETS BY MOUTH IN THE AM & 2 TABLETS IN THE PM 5/9/23   Norma Ruiz MD   nateglinide (STARLIX) 120 MG tablet TAKE 1 TABLET BY MOUTH THREE TIMES A DAY WITH MEALS 6/13/23   Norma Ruiz MD   lisinopril (PRINIVIL;ZESTRIL) 10 MG tablet Take 1 tablet by mouth daily 4/27/23   Norma Ruiz MD   loratadine (CLARITIN) 10 MG tablet TAKE 1 TABLET BY MOUTH EVERY DAY AS NEEDED FOR ALLERGY SYMPTOMS/CONGESTION 6/11/23   Norma Ruiz MD   esomeprazole Magnesium (NEXIUM) 20 MG PACK Take 1 packet by mouth daily    Joseph

## 2024-08-19 DIAGNOSIS — I63.9 CEREBROVASCULAR ACCIDENT (CVA), UNSPECIFIED MECHANISM (HCC): ICD-10-CM

## 2024-08-20 RX ORDER — GABAPENTIN 100 MG/1
200 CAPSULE ORAL 3 TIMES DAILY
Qty: 180 CAPSULE | Refills: 2 | Status: SHIPPED | OUTPATIENT
Start: 2024-08-20 | End: 2024-11-18

## 2024-08-20 NOTE — TELEPHONE ENCOUNTER
Pharmacy requesting refill of gabapentin (NEURONTIN) 100 MG capsule      Medication active on med list yes      Date of last Rx: 6/6/2024 with 1 refills          verified by DENISSE SCHWARZ      Date of last appointment 6/6/2024    Next Visit Date:  10/31/2024

## 2024-09-18 RX ORDER — ASPIRIN 81 MG/1
TABLET, COATED ORAL
Qty: 90 TABLET | Refills: 1 | Status: SHIPPED | OUTPATIENT
Start: 2024-09-18

## 2024-10-03 ENCOUNTER — HOSPITAL ENCOUNTER (OUTPATIENT)
Dept: SLEEP CENTER | Age: 54
Discharge: HOME | End: 2024-10-05
Attending: PSYCHIATRY & NEUROLOGY
Payer: COMMERCIAL

## 2024-10-03 DIAGNOSIS — G47.30 SLEEP APNEA IN ADULT: ICD-10-CM

## 2024-10-03 PROCEDURE — G0399 HOME SLEEP TEST/TYPE 3 PORTA: HCPCS

## 2024-10-07 ENCOUNTER — HOSPITAL ENCOUNTER (OUTPATIENT)
Age: 54
Discharge: HOME | End: 2024-10-07
Payer: COMMERCIAL

## 2024-10-07 DIAGNOSIS — E13.42 DIABETIC POLYNEUROPATHY ASSOCIATED WITH OTHER SPECIFIED DIABETES MELLITUS (HCC): ICD-10-CM

## 2024-10-07 DIAGNOSIS — R27.0 ATAXIA: ICD-10-CM

## 2024-10-07 DIAGNOSIS — I63.9 CEREBROVASCULAR ACCIDENT (CVA), UNSPECIFIED MECHANISM (HCC): ICD-10-CM

## 2024-10-07 DIAGNOSIS — R20.0 LEFT LEG NUMBNESS: ICD-10-CM

## 2024-10-07 LAB
CHOLEST SERPL-MCNC: 188 MG/DL (ref 0–199)
CHOLESTEROL/HDL RATIO: 4
HDLC SERPL-MCNC: 43 MG/DL
LDLC SERPL CALC-MCNC: 114 MG/DL (ref 0–100)
TRIGL SERPL-MCNC: 156 MG/DL
VLDLC SERPL CALC-MCNC: 31 MG/DL

## 2024-10-07 PROCEDURE — 84155 ASSAY OF PROTEIN SERUM: CPT

## 2024-10-07 PROCEDURE — 84165 PROTEIN E-PHORESIS SERUM: CPT

## 2024-10-07 PROCEDURE — 80061 LIPID PANEL: CPT

## 2024-10-07 PROCEDURE — 84166 PROTEIN E-PHORESIS/URINE/CSF: CPT

## 2024-10-07 PROCEDURE — 36415 COLL VENOUS BLD VENIPUNCTURE: CPT

## 2024-10-07 PROCEDURE — 84156 ASSAY OF PROTEIN URINE: CPT

## 2024-10-09 LAB
ALBUMIN PERCENT: 63 % (ref 45–65)
ALBUMIN SERPL-MCNC: 4.3 G/DL (ref 3.2–5.2)
ALPHA 2 PERCENT: 12 % (ref 6–13)
ALPHA1 GLOB SERPL ELPH-MCNC: 0.3 G/DL (ref 0.1–0.4)
ALPHA1 GLOB SERPL ELPH-MCNC: 5 % (ref 3–6)
ALPHA2 GLOB SERPL ELPH-MCNC: 0.8 G/DL (ref 0.5–0.9)
B-GLOBULIN SERPL ELPH-MCNC: 0.8 G/DL (ref 0.5–1.1)
B-GLOBULIN SERPL ELPH-MCNC: 11 % (ref 11–19)
GAMMA GLOB SERPL ELPH-MCNC: 0.7 G/DL (ref 0.5–1.5)
GAMMA GLOBULIN %: 11 % (ref 9–20)
P E INTERPRETATION, U: NORMAL
PATHOLOGIST: NORMAL
PATHOLOGIST: NORMAL
PROT PATTERN SERPL ELPH-IMP: NORMAL
PROT SERPL-MCNC: 6.9 G/DL (ref 6.6–8.7)
SPECIMEN TYPE: NORMAL
TOTAL PROT. SUM,%: 102 % (ref 98–102)
TOTAL PROT. SUM: 6.9 G/DL (ref 6.3–8.2)
URINE TOTAL PROTEIN: 8 MG/DL

## 2024-10-15 PROBLEM — G47.30 SLEEP APNEA IN ADULT: Status: ACTIVE | Noted: 2024-10-15

## 2024-10-15 LAB — STATUS: NORMAL

## 2024-10-30 ENCOUNTER — OFFICE VISIT (OUTPATIENT)
Dept: NEUROLOGY | Age: 54
End: 2024-10-30
Payer: COMMERCIAL

## 2024-10-30 VITALS
HEART RATE: 99 BPM | SYSTOLIC BLOOD PRESSURE: 160 MMHG | DIASTOLIC BLOOD PRESSURE: 93 MMHG | BODY MASS INDEX: 28.63 KG/M2 | WEIGHT: 216 LBS | HEIGHT: 73 IN

## 2024-10-30 DIAGNOSIS — R20.0 LEFT LEG NUMBNESS: ICD-10-CM

## 2024-10-30 DIAGNOSIS — I63.9 CEREBROVASCULAR ACCIDENT (CVA), UNSPECIFIED MECHANISM (HCC): ICD-10-CM

## 2024-10-30 DIAGNOSIS — E13.42 DIABETIC POLYNEUROPATHY ASSOCIATED WITH OTHER SPECIFIED DIABETES MELLITUS (HCC): ICD-10-CM

## 2024-10-30 DIAGNOSIS — G47.30 SLEEP APNEA IN ADULT: Primary | ICD-10-CM

## 2024-10-30 DIAGNOSIS — R27.0 ATAXIA: ICD-10-CM

## 2024-10-30 PROCEDURE — 99214 OFFICE O/P EST MOD 30 MIN: CPT | Performed by: PSYCHIATRY & NEUROLOGY

## 2024-10-30 PROCEDURE — 3052F HG A1C>EQUAL 8.0%<EQUAL 9.0%: CPT | Performed by: PSYCHIATRY & NEUROLOGY

## 2024-10-30 RX ORDER — GABAPENTIN 100 MG/1
300 CAPSULE ORAL 3 TIMES DAILY
Qty: 180 CAPSULE | Refills: 2 | Status: SHIPPED | OUTPATIENT
Start: 2024-10-30 | End: 2025-01-28

## 2024-10-30 NOTE — PROGRESS NOTES
[facility] [place]  [address], [address]  [place] [address]  Ph: [phone] or [phone]  FAX: [phone]    Chief Complaint: Left leg weakness, unsteadiness       I had the pleasure of seeing your patient today in neurology consultation for his symptoms. As you would recall Andi Nava is a 53 y.o. male.  Right-handed with history of diabetes type 2, cleft palate, daily alcohol use and hypertension was admitted recently to the hospital on 6/25/23 for acute presentation of left leg unsteadiness.    The patient is a manager at [employer] and his wife is a nurse.  She accompanies him today.  Baseline he drinks at least 6 beers every day for the past many years.    Baseline he has numbness and tingling in his bilateral lower extremities.  He was at his nieces graduation party and noticed that his left leg was strange and unsteady.  There was no pain but was concerning for weakness/numbness which led to a fall and then progressively worsening to the point where the patient was unable to ambulate without assistance.  He also had chronic left pain baseline.  He was loaded with aspirin 325 and clopidogrel 300 and then advised to continue aspirin 81 mg daily and Plavix 75 mg only for 3 weeks following which aspirin monotherapy.  His MRI of the brain did not show a stroke.  CTA of the head and neck showed a nondominant/occluded left V4.  His NIHSS was 1.    The patient has been snoring during nights with difficulty in concentration and feeling very tired during the days.    He reports to walking very unsteady baseline    Prior to this he never took an aspirin or atorvastatin or was on Eliquis.  He was out of the window for TNK    He was a prior smoker and quit 20 years ago.  He still takes 6-8 beers every day    EMG shows sensory>motor neuropathy with L5-S1 radiculopathy with chronic denervation.    Home medications verified with pt's wife:  Glipizide 20 mg twice

## 2024-11-17 ENCOUNTER — HOSPITAL ENCOUNTER (OUTPATIENT)
Dept: SLEEP CENTER | Age: 54
Discharge: HOME | End: 2024-11-19
Payer: COMMERCIAL

## 2024-11-17 VITALS — HEIGHT: 73 IN | BODY MASS INDEX: 28.63 KG/M2 | WEIGHT: 216 LBS

## 2024-11-17 PROCEDURE — 95811 POLYSOM 6/>YRS CPAP 4/> PARM: CPT

## 2024-11-24 LAB — STATUS: NORMAL

## 2024-12-11 ENCOUNTER — TELEPHONE (OUTPATIENT)
Dept: NEUROLOGY | Age: 54
End: 2024-12-11

## 2024-12-11 NOTE — TELEPHONE ENCOUNTER
Andi called in and asked if we had any information regarding getting his CPAP setup.  I told him i would contact the sleep lab to find out if the order had been sent through to his [employer].  Keshav Herman at the Sleep lab. the order went to Jese on 11/25/24. He gave me a name and phone number for him to contact.  Agustina at [phone].  I did give him this information.

## 2025-01-14 ENCOUNTER — OFFICE VISIT (OUTPATIENT)
Dept: PODIATRY | Age: 55
End: 2025-01-14
Payer: COMMERCIAL

## 2025-01-14 VITALS — BODY MASS INDEX: 28.63 KG/M2 | WEIGHT: 216 LBS | HEIGHT: 73 IN

## 2025-01-14 DIAGNOSIS — E11.42 DIABETIC POLYNEUROPATHY ASSOCIATED WITH TYPE 2 DIABETES MELLITUS (HCC): Primary | ICD-10-CM

## 2025-01-14 DIAGNOSIS — S98.131A AMPUTATED TOE, RIGHT (HCC): ICD-10-CM

## 2025-01-14 PROCEDURE — 99203 OFFICE O/P NEW LOW 30 MIN: CPT | Performed by: PODIATRIST

## 2025-01-14 NOTE — PROGRESS NOTES
[facility] PHYSICIANS [facility] PODI[facility]  [address] [place], [place] [address]  Dept: [phone]    NEW PATIENT PROGRESS NOTE  Date of patient's visit: 1/14/2025  Patient's Name:  Andi Nava YOB: 1970            Patient Care Team:  Anil Koehler MD as PCP - General (Family Medicine)        Chief Complaint   Patient presents with    New Patient     [facility] care    Diabetes     Diabetic foot care    Peripheral Neuropathy     Bl feet         HPI:   Andi Nava is a 54 y.o. male who presents to the office today for diabetic foot care, peripheral neuropathy.  Symptoms began 7 year(s) ago. Patient relates pain is Absent .  Pain is rated 0 out of 10 and is described as none.  Treatments prior to today's visit include: none.  Currently denies F/C/N/V. Pt's primary care physician is Anil Koehler MD last seen February 18 2020  patient has had a 2nd toe amputation on the right foot performed by dr nilesh barksdale in the past.     No Known Allergies    Past Medical History:   Diagnosis Date    Type 2 diabetes mellitus (HCC)        Prior to Admission medications    Medication Sig Start Date End Date Taking? Authorizing Provider   gabapentin (NEURONTIN) 100 MG capsule Take 3 capsules by mouth 3 times daily for 90 days. 10/30/24 1/28/25 Yes Alon Cho MD   ASPIRIN LOW DOSE 81 MG EC tablet TAKE 1 TABLET BY MOUTH EVERY DAY 9/18/24  Yes Alon Cho MD   pioglitazone (ACTOS) 30 MG tablet Take 1 tablet by mouth nightly at bedtime 5/29/24  Yes Norma Ruiz MD   atorvastatin (LIPITOR) 80 MG tablet TAKE 1 TABLET BY MOUTH EVERY DAY AT NIGHT 4/12/24  Yes Alon Cho MD   JARDIANCE 25 MG tablet Take 1 tablet by mouth daily 6/13/23  Yes Norma Ruiz MD   glipiZIDE (GLUCOTROL) 10 MG tablet TAKE 2 TABLETS BY MOUTH EVERY MORNING AND TAKE 2 TABLETS WITH DINNER 5/10/23  Yes Norma Ruiz MD   metFORMIN (GLUCOPHAGE) 500 MG tablet

## 2025-01-28 ENCOUNTER — TELEPHONE (OUTPATIENT)
Dept: NEUROLOGY | Age: 55
End: 2025-01-28

## 2025-01-28 NOTE — TELEPHONE ENCOUNTER
Pt called in wanting to know if the dose of his Gabapentin. I asked if he was still taking the 300 mg TID and one at HS or just TID? He asked what dose was sent in for him? The script was sent in for 100 mg 3 caps TID. He said that he was actually not taking that many and will increase to that dose and see how he feels. He will call us back and let us know.

## 2025-03-13 RX ORDER — ASPIRIN 81 MG/1
81 TABLET, COATED ORAL DAILY
Qty: 90 TABLET | Refills: 1 | OUTPATIENT
Start: 2025-03-13

## 2025-03-14 ENCOUNTER — RESULTS FOLLOW-UP (OUTPATIENT)
Dept: SLEEP CENTER | Age: 55
End: 2025-03-14

## 2025-03-14 NOTE — RESULT ENCOUNTER NOTE
He received a sleep apnea machine and he has been having difficulty with the facemask.  I advised him to use nasal pillows.  Discussed about compliance

## 2025-05-28 ENCOUNTER — OFFICE VISIT (OUTPATIENT)
Dept: NEUROLOGY | Age: 55
End: 2025-05-28
Payer: COMMERCIAL

## 2025-05-28 VITALS
BODY MASS INDEX: 26.24 KG/M2 | SYSTOLIC BLOOD PRESSURE: 134 MMHG | HEART RATE: 105 BPM | WEIGHT: 198 LBS | HEIGHT: 73 IN | DIASTOLIC BLOOD PRESSURE: 84 MMHG

## 2025-05-28 DIAGNOSIS — G47.30 SLEEP APNEA IN ADULT: Primary | ICD-10-CM

## 2025-05-28 DIAGNOSIS — R20.0 LEFT LEG NUMBNESS: ICD-10-CM

## 2025-05-28 DIAGNOSIS — I63.9 CEREBROVASCULAR ACCIDENT (CVA), UNSPECIFIED MECHANISM (HCC): ICD-10-CM

## 2025-05-28 DIAGNOSIS — E13.42 DIABETIC POLYNEUROPATHY ASSOCIATED WITH OTHER SPECIFIED DIABETES MELLITUS (HCC): ICD-10-CM

## 2025-05-28 DIAGNOSIS — R27.0 ATAXIA: ICD-10-CM

## 2025-05-28 PROCEDURE — 99214 OFFICE O/P EST MOD 30 MIN: CPT | Performed by: PSYCHIATRY & NEUROLOGY

## 2025-05-28 RX ORDER — GABAPENTIN 300 MG/1
300 CAPSULE ORAL 3 TIMES DAILY
Qty: 270 CAPSULE | Refills: 3 | Status: SHIPPED | OUTPATIENT
Start: 2025-05-28 | End: 2025-08-26

## 2025-05-28 RX ORDER — SEMAGLUTIDE 2.68 MG/ML
INJECTION, SOLUTION SUBCUTANEOUS
COMMUNITY
Start: 2025-04-10

## 2025-05-28 NOTE — PROGRESS NOTES
[facility] [place]  [address], [address]  [place] [address]  Ph: [phone] or [phone]  FAX: [phone]    Chief Complaint: Left leg weakness, unsteadiness       I had the pleasure of seeing your patient today in neurology consultation for his symptoms. As you would recall Andi Nava is a 53 y.o. male.  Right-handed with history of diabetes type 2, cleft palate, daily alcohol use and hypertension was admitted recently to the hospital on 6/25/23 for acute presentation of left leg unsteadiness.    The patient is a manager at [employer] and his wife is a nurse.  She accompanies him today.  Baseline he drinks at least 6 beers every day for the past many years.    Baseline he has numbness and tingling in his bilateral lower extremities.  He was at his nieces graduation party and noticed that his left leg was strange and unsteady.  There was no pain but was concerning for weakness/numbness which led to a fall and then progressively worsening to the point where the patient was unable to ambulate without assistance.  He also had chronic left pain baseline.  He was loaded with aspirin 325 and clopidogrel 300 and then advised to continue aspirin 81 mg daily and Plavix 75 mg only for 3 weeks following which aspirin monotherapy.  His MRI of the brain did not show a stroke.  CTA of the head and neck showed a nondominant/occluded left V4.  His NIHSS was 1.    The patient has been snoring during nights with difficulty in concentration and feeling very tired during the days.    He reports to walking very unsteady baseline    Prior to this he never took an aspirin or atorvastatin or was on Eliquis.  He was out of the window for TNK    He was a prior smoker and quit 20 years ago.  He still takes 6-8 beers every day    EMG shows sensory>motor neuropathy with L5-S1 radiculopathy with chronic denervation.    Home medications verified with pt's wife:  Glipizide 20 mg twice

## (undated) DEVICE — SOLUTION IRRIG 1000ML 0.9% SOD CHL USP POUR PLAS BTL

## (undated) DEVICE — PREMIUM DRY TRAY LF: Brand: MEDLINE INDUSTRIES, INC.

## (undated) DEVICE — GOWN,SIRUS,NONRNF,XLN/XL,20/CS: Brand: MEDLINE

## (undated) DEVICE — GLOVE ORANGE PI 8 1/2   MSG9085

## (undated) DEVICE — SOLUTION SCRB 4OZ 4% CHG H2O AIDED FOR PREOPERATIVE SKIN

## (undated) DEVICE — TUBING, SUCTION, 3/16" X 10', STRAIGHT: Brand: MEDLINE

## (undated) DEVICE — MHPB HAND AND FOOT PACK: Brand: MEDLINE INDUSTRIES, INC.

## (undated) DEVICE — CONTAINER,SPECIMEN,4OZ,OR STRL: Brand: MEDLINE

## (undated) DEVICE — BANDAGE GZ W2XL75IN ST RAYON POLY CNFRM STRTCH LTWT

## (undated) DEVICE — DRAPE,EXTREMITY,89X128,STERILE: Brand: MEDLINE

## (undated) DEVICE — SUTURE MONOCRYL SZ 3 0 L18IN ABSRB UD PS 2 3 8 CIR REV CUT NDL MCP497G

## (undated) DEVICE — SUTURE PROL SZ 3-0 L18IN NONABSORBABLE BLU L19MM PS-2 3/8 8687H

## (undated) DEVICE — YANKAUER,FLEXIBLE HANDLE,REGLR CAPACITY: Brand: MEDLINE INDUSTRIES, INC.